# Patient Record
Sex: FEMALE | Race: BLACK OR AFRICAN AMERICAN | NOT HISPANIC OR LATINO | URBAN - METROPOLITAN AREA
[De-identification: names, ages, dates, MRNs, and addresses within clinical notes are randomized per-mention and may not be internally consistent; named-entity substitution may affect disease eponyms.]

---

## 2018-07-31 ENCOUNTER — INPATIENT (INPATIENT)
Facility: HOSPITAL | Age: 83
LOS: 0 days | Discharge: HOME | DRG: 287 | End: 2018-08-01
Attending: INTERNAL MEDICINE | Admitting: SPECIALIST
Payer: MEDICARE

## 2018-07-31 ENCOUNTER — TRANSCRIPTION ENCOUNTER (OUTPATIENT)
Age: 83
End: 2018-07-31

## 2018-07-31 VITALS
DIASTOLIC BLOOD PRESSURE: 65 MMHG | OXYGEN SATURATION: 100 % | RESPIRATION RATE: 16 BRPM | HEIGHT: 66 IN | HEART RATE: 66 BPM | SYSTOLIC BLOOD PRESSURE: 147 MMHG | WEIGHT: 203.05 LBS | TEMPERATURE: 98 F

## 2018-07-31 DIAGNOSIS — R94.31 ABNORMAL ELECTROCARDIOGRAM [ECG] [EKG]: ICD-10-CM

## 2018-07-31 DIAGNOSIS — Z98.891 HISTORY OF UTERINE SCAR FROM PREVIOUS SURGERY: Chronic | ICD-10-CM

## 2018-07-31 DIAGNOSIS — H25.9 UNSPECIFIED AGE-RELATED CATARACT: Chronic | ICD-10-CM

## 2018-07-31 DIAGNOSIS — Z98.890 OTHER SPECIFIED POSTPROCEDURAL STATES: Chronic | ICD-10-CM

## 2018-07-31 DIAGNOSIS — Z90.710 ACQUIRED ABSENCE OF BOTH CERVIX AND UTERUS: Chronic | ICD-10-CM

## 2018-07-31 LAB
ALBUMIN SERPL ELPH-MCNC: 4.3 G/DL — SIGNIFICANT CHANGE UP (ref 3.3–5)
ALP SERPL-CCNC: 56 U/L — SIGNIFICANT CHANGE UP (ref 40–120)
ALT FLD-CCNC: 18 U/L — SIGNIFICANT CHANGE UP (ref 10–45)
ANION GAP SERPL CALC-SCNC: 12 MMOL/L — SIGNIFICANT CHANGE UP (ref 5–17)
AST SERPL-CCNC: 21 U/L — SIGNIFICANT CHANGE UP (ref 10–40)
BILIRUB SERPL-MCNC: 0.5 MG/DL — SIGNIFICANT CHANGE UP (ref 0.2–1.2)
BUN SERPL-MCNC: 18 MG/DL — SIGNIFICANT CHANGE UP (ref 7–23)
CALCIUM SERPL-MCNC: 10.1 MG/DL — SIGNIFICANT CHANGE UP (ref 8.4–10.5)
CHLORIDE SERPL-SCNC: 102 MMOL/L — SIGNIFICANT CHANGE UP (ref 96–108)
CO2 SERPL-SCNC: 26 MMOL/L — SIGNIFICANT CHANGE UP (ref 22–31)
CREAT SERPL-MCNC: 0.94 MG/DL — SIGNIFICANT CHANGE UP (ref 0.5–1.3)
GLUCOSE BLDC GLUCOMTR-MCNC: 112 MG/DL — HIGH (ref 70–99)
GLUCOSE BLDC GLUCOMTR-MCNC: 269 MG/DL — HIGH (ref 70–99)
GLUCOSE SERPL-MCNC: 133 MG/DL — HIGH (ref 70–99)
HCT VFR BLD CALC: 39.7 % — SIGNIFICANT CHANGE UP (ref 34.5–45)
HGB BLD-MCNC: 13 G/DL — SIGNIFICANT CHANGE UP (ref 11.5–15.5)
MCHC RBC-ENTMCNC: 24.6 PG — LOW (ref 27–34)
MCHC RBC-ENTMCNC: 32.8 GM/DL — SIGNIFICANT CHANGE UP (ref 32–36)
MCV RBC AUTO: 74.9 FL — LOW (ref 80–100)
PLATELET # BLD AUTO: 189 K/UL — SIGNIFICANT CHANGE UP (ref 150–400)
POTASSIUM SERPL-MCNC: 3.8 MMOL/L — SIGNIFICANT CHANGE UP (ref 3.5–5.3)
POTASSIUM SERPL-SCNC: 3.8 MMOL/L — SIGNIFICANT CHANGE UP (ref 3.5–5.3)
PROT SERPL-MCNC: 7.5 G/DL — SIGNIFICANT CHANGE UP (ref 6–8.3)
RBC # BLD: 5.31 M/UL — HIGH (ref 3.8–5.2)
RBC # FLD: 14.4 % — SIGNIFICANT CHANGE UP (ref 10.3–14.5)
SODIUM SERPL-SCNC: 140 MMOL/L — SIGNIFICANT CHANGE UP (ref 135–145)
WBC # BLD: 6.8 K/UL — SIGNIFICANT CHANGE UP (ref 3.8–10.5)
WBC # FLD AUTO: 6.8 K/UL — SIGNIFICANT CHANGE UP (ref 3.8–10.5)

## 2018-07-31 PROCEDURE — 93010 ELECTROCARDIOGRAM REPORT: CPT

## 2018-07-31 RX ORDER — DEXTROSE 50 % IN WATER 50 %
12.5 SYRINGE (ML) INTRAVENOUS ONCE
Qty: 0 | Refills: 0 | Status: DISCONTINUED | OUTPATIENT
Start: 2018-07-31 | End: 2018-08-01

## 2018-07-31 RX ORDER — DEXTROSE 50 % IN WATER 50 %
25 SYRINGE (ML) INTRAVENOUS ONCE
Qty: 0 | Refills: 0 | Status: DISCONTINUED | OUTPATIENT
Start: 2018-07-31 | End: 2018-08-01

## 2018-07-31 RX ORDER — PANTOPRAZOLE SODIUM 20 MG/1
40 TABLET, DELAYED RELEASE ORAL
Qty: 0 | Refills: 0 | Status: DISCONTINUED | OUTPATIENT
Start: 2018-07-31 | End: 2018-08-01

## 2018-07-31 RX ORDER — TRIAMTERENE/HYDROCHLOROTHIAZID 75 MG-50MG
1 TABLET ORAL
Qty: 0 | Refills: 0 | COMMUNITY

## 2018-07-31 RX ORDER — SODIUM CHLORIDE 9 MG/ML
1000 INJECTION, SOLUTION INTRAVENOUS
Qty: 0 | Refills: 0 | Status: DISCONTINUED | OUTPATIENT
Start: 2018-07-31 | End: 2018-08-01

## 2018-07-31 RX ORDER — DOCUSATE SODIUM 100 MG
100 CAPSULE ORAL
Qty: 0 | Refills: 0 | Status: DISCONTINUED | OUTPATIENT
Start: 2018-07-31 | End: 2018-08-01

## 2018-07-31 RX ORDER — DEXTROSE 50 % IN WATER 50 %
15 SYRINGE (ML) INTRAVENOUS ONCE
Qty: 0 | Refills: 0 | Status: DISCONTINUED | OUTPATIENT
Start: 2018-07-31 | End: 2018-08-01

## 2018-07-31 RX ORDER — GLUCAGON INJECTION, SOLUTION 0.5 MG/.1ML
1 INJECTION, SOLUTION SUBCUTANEOUS ONCE
Qty: 0 | Refills: 0 | Status: DISCONTINUED | OUTPATIENT
Start: 2018-07-31 | End: 2018-08-01

## 2018-07-31 RX ORDER — TRIAMTERENE/HYDROCHLOROTHIAZID 75 MG-50MG
1 TABLET ORAL EVERY OTHER DAY
Qty: 0 | Refills: 0 | Status: DISCONTINUED | OUTPATIENT
Start: 2018-07-31 | End: 2018-08-01

## 2018-07-31 RX ORDER — ASPIRIN/CALCIUM CARB/MAGNESIUM 324 MG
81 TABLET ORAL DAILY
Qty: 0 | Refills: 0 | Status: DISCONTINUED | OUTPATIENT
Start: 2018-07-31 | End: 2018-08-01

## 2018-07-31 RX ORDER — MULTIVIT-MIN/FERROUS GLUCONATE 9 MG/15 ML
1 LIQUID (ML) ORAL DAILY
Qty: 0 | Refills: 0 | Status: DISCONTINUED | OUTPATIENT
Start: 2018-07-31 | End: 2018-08-01

## 2018-07-31 RX ORDER — INSULIN LISPRO 100/ML
VIAL (ML) SUBCUTANEOUS AT BEDTIME
Qty: 0 | Refills: 0 | Status: DISCONTINUED | OUTPATIENT
Start: 2018-07-31 | End: 2018-08-01

## 2018-07-31 RX ORDER — METOPROLOL TARTRATE 50 MG
25 TABLET ORAL
Qty: 0 | Refills: 0 | Status: DISCONTINUED | OUTPATIENT
Start: 2018-07-31 | End: 2018-08-01

## 2018-07-31 RX ORDER — INSULIN LISPRO 100/ML
VIAL (ML) SUBCUTANEOUS
Qty: 0 | Refills: 0 | Status: DISCONTINUED | OUTPATIENT
Start: 2018-07-31 | End: 2018-08-01

## 2018-07-31 RX ORDER — NIFEDIPINE 30 MG
60 TABLET, EXTENDED RELEASE 24 HR ORAL DAILY
Qty: 0 | Refills: 0 | Status: DISCONTINUED | OUTPATIENT
Start: 2018-07-31 | End: 2018-08-01

## 2018-07-31 RX ORDER — METOPROLOL TARTRATE 50 MG
1 TABLET ORAL
Qty: 0 | Refills: 0 | COMMUNITY

## 2018-07-31 RX ORDER — ESOMEPRAZOLE MAGNESIUM 40 MG/1
1 CAPSULE, DELAYED RELEASE ORAL
Qty: 0 | Refills: 0 | COMMUNITY

## 2018-07-31 RX ADMIN — Medication 1: at 22:07

## 2018-07-31 RX ADMIN — Medication 100 MILLIGRAM(S): at 20:55

## 2018-07-31 RX ADMIN — Medication 25 MILLIGRAM(S): at 20:55

## 2018-07-31 NOTE — H&P CARDIOLOGY - PMH
Arthritis    Cardiomyopathy    Carpal tunnel syndrome    Diverticulitis    DM (diabetes mellitus) type II, controlled, with peripheral vascular disorder    Glaucoma    Gout    Hernia, inguinal, right    HTN (hypertension)    Hyperlipemia    Umbilical hernia

## 2018-07-31 NOTE — H&P CARDIOLOGY - PSH
H/O lumpectomy  both breasts cysts  H/O:     Senile cataracts of both eyes H/O hysterectomy for benign disease    H/O lumpectomy  both breasts cysts  H/O parathyroidectomy    H/O:     Senile cataracts of both eyes

## 2018-07-31 NOTE — H&P CARDIOLOGY - HISTORY OF PRESENT ILLNESS
This is an 85y/o [facility] female with PMHx CM, DM Type 2 non-insulin on Prandin HGb AIC 2 weeks ago 5.5  compliant with meds , HLD , Essential HTN, Angina Pectoris. Pt went to Dr Morocho (cardiologist ) due to recent chest pains hx arthritis in her ribs  and had echo done. Pt was told "I have a blockage in left artery of heart " and needs angiogram .  recommended Stress test that was done on 7/28/18 and results noted to be Abnormal. Now referred for Cardiac cath with Dr Alfaro. Currently CP free no sob no palpitations no lightheadedness or dizziness noted.   As per patient +stress test no results on chart at this time .

## 2018-08-01 VITALS
SYSTOLIC BLOOD PRESSURE: 124 MMHG | HEART RATE: 56 BPM | DIASTOLIC BLOOD PRESSURE: 60 MMHG | OXYGEN SATURATION: 98 % | TEMPERATURE: 98 F | RESPIRATION RATE: 18 BRPM

## 2018-08-01 DIAGNOSIS — Z98.890 OTHER SPECIFIED POSTPROCEDURAL STATES: ICD-10-CM

## 2018-08-01 DIAGNOSIS — I10 ESSENTIAL (PRIMARY) HYPERTENSION: ICD-10-CM

## 2018-08-01 DIAGNOSIS — E78.5 HYPERLIPIDEMIA, UNSPECIFIED: ICD-10-CM

## 2018-08-01 LAB
ANION GAP SERPL CALC-SCNC: 13 MMOL/L — SIGNIFICANT CHANGE UP (ref 5–17)
BUN SERPL-MCNC: 18 MG/DL — SIGNIFICANT CHANGE UP (ref 7–23)
CALCIUM SERPL-MCNC: 10.1 MG/DL — SIGNIFICANT CHANGE UP (ref 8.4–10.5)
CHLORIDE SERPL-SCNC: 101 MMOL/L — SIGNIFICANT CHANGE UP (ref 96–108)
CO2 SERPL-SCNC: 25 MMOL/L — SIGNIFICANT CHANGE UP (ref 22–31)
CREAT SERPL-MCNC: 1.02 MG/DL — SIGNIFICANT CHANGE UP (ref 0.5–1.3)
GLUCOSE BLDC GLUCOMTR-MCNC: 180 MG/DL — HIGH (ref 70–99)
GLUCOSE BLDC GLUCOMTR-MCNC: 99 MG/DL — SIGNIFICANT CHANGE UP (ref 70–99)
GLUCOSE SERPL-MCNC: 247 MG/DL — HIGH (ref 70–99)
HCT VFR BLD CALC: 39 % — SIGNIFICANT CHANGE UP (ref 34.5–45)
HGB BLD-MCNC: 12.9 G/DL — SIGNIFICANT CHANGE UP (ref 11.5–15.5)
MCHC RBC-ENTMCNC: 24.6 PG — LOW (ref 27–34)
MCHC RBC-ENTMCNC: 33 GM/DL — SIGNIFICANT CHANGE UP (ref 32–36)
MCV RBC AUTO: 74.5 FL — LOW (ref 80–100)
PLATELET # BLD AUTO: 170 K/UL — SIGNIFICANT CHANGE UP (ref 150–400)
POTASSIUM SERPL-MCNC: 4 MMOL/L — SIGNIFICANT CHANGE UP (ref 3.5–5.3)
POTASSIUM SERPL-SCNC: 4 MMOL/L — SIGNIFICANT CHANGE UP (ref 3.5–5.3)
RBC # BLD: 5.24 M/UL — HIGH (ref 3.8–5.2)
RBC # FLD: 14.1 % — SIGNIFICANT CHANGE UP (ref 10.3–14.5)
SODIUM SERPL-SCNC: 139 MMOL/L — SIGNIFICANT CHANGE UP (ref 135–145)
WBC # BLD: 10.5 K/UL — SIGNIFICANT CHANGE UP (ref 3.8–10.5)
WBC # FLD AUTO: 10.5 K/UL — SIGNIFICANT CHANGE UP (ref 3.8–10.5)

## 2018-08-01 PROCEDURE — 80048 BASIC METABOLIC PNL TOTAL CA: CPT

## 2018-08-01 PROCEDURE — C1887: CPT

## 2018-08-01 PROCEDURE — 82962 GLUCOSE BLOOD TEST: CPT

## 2018-08-01 PROCEDURE — C1894: CPT

## 2018-08-01 PROCEDURE — 85027 COMPLETE CBC AUTOMATED: CPT

## 2018-08-01 PROCEDURE — 93460 R&L HRT ART/VENTRICLE ANGIO: CPT

## 2018-08-01 PROCEDURE — 80053 COMPREHEN METABOLIC PANEL: CPT

## 2018-08-01 PROCEDURE — 93005 ELECTROCARDIOGRAM TRACING: CPT

## 2018-08-01 PROCEDURE — C1769: CPT

## 2018-08-01 RX ORDER — HYDROCORTISONE 20 MG
100 TABLET ORAL ONCE
Qty: 0 | Refills: 0 | Status: COMPLETED | OUTPATIENT
Start: 2018-08-01 | End: 2018-08-01

## 2018-08-01 RX ORDER — ASPIRIN/CALCIUM CARB/MAGNESIUM 324 MG
243 TABLET ORAL ONCE
Qty: 0 | Refills: 0 | Status: DISCONTINUED | OUTPATIENT
Start: 2018-08-01 | End: 2018-08-01

## 2018-08-01 RX ORDER — DIPHENHYDRAMINE HCL 50 MG
50 CAPSULE ORAL ONCE
Qty: 0 | Refills: 0 | Status: DISCONTINUED | OUTPATIENT
Start: 2018-08-01 | End: 2018-08-01

## 2018-08-01 RX ORDER — CLOPIDOGREL BISULFATE 75 MG/1
600 TABLET, FILM COATED ORAL ONCE
Qty: 0 | Refills: 0 | Status: DISCONTINUED | OUTPATIENT
Start: 2018-08-01 | End: 2018-08-01

## 2018-08-01 RX ADMIN — PANTOPRAZOLE SODIUM 40 MILLIGRAM(S): 20 TABLET, DELAYED RELEASE ORAL at 05:37

## 2018-08-01 RX ADMIN — Medication 81 MILLIGRAM(S): at 05:37

## 2018-08-01 RX ADMIN — Medication 100 MILLIGRAM(S): at 05:37

## 2018-08-01 RX ADMIN — Medication 20 MILLIGRAM(S): at 05:37

## 2018-08-01 RX ADMIN — Medication 1 TABLET(S): at 05:37

## 2018-08-01 RX ADMIN — Medication 25 MILLIGRAM(S): at 05:37

## 2018-08-01 RX ADMIN — Medication 1: at 11:27

## 2018-08-01 RX ADMIN — Medication 100 MILLIGRAM(S): at 09:41

## 2018-08-01 NOTE — DISCHARGE NOTE ADULT - ADDITIONAL INSTRUCTIONS
follow up with Dr Alfaro in one week    No heavy lifting, strenuous activity, bending, straining or unneccessary stair climbing  for 2 weeks. No sex for 1 week.  No driving for 2 days. You may shower 24 hours following procedure but avoid baths and swimming for 1 week. Check groin site for bleeding and/or swelling daily following procedure. Call your doctor/cardiologist immediately should it occur or if you have increased/persistent pain at the site. Follow up with your cardiologist in 1- 2 weeks. You may call [facility] at [phone] or [phone] after office hours and weekends  with any questions or concerns following your procedure. Take medications as prescribed.

## 2018-08-01 NOTE — PROGRESS NOTE ADULT - SUBJECTIVE AND OBJECTIVE BOX
Patient is a 84y old  Female who presents with a chief complaint of         Allergies    angiotensin converting enzyme inhibitors (Unknown)  iodine (Hives)  pt allergic to cataloupe (Hives)  shellfish (Hives)  Vasotec (Unknown)    Intolerances        Medications:  aspirin enteric coated 81 milliGRAM(s) Oral daily  dextrose 40% Gel 15 Gram(s) Oral once PRN  dextrose 5%. 1000 milliLiter(s) IV Continuous <Continuous>  dextrose 50% Injectable 12.5 Gram(s) IV Push once  dextrose 50% Injectable 25 Gram(s) IV Push once  dextrose 50% Injectable 25 Gram(s) IV Push once  docusate sodium 100 milliGRAM(s) Oral two times a day  glucagon  Injectable 1 milliGRAM(s) IntraMuscular once PRN  insulin lispro (HumaLOG) corrective regimen sliding scale   SubCutaneous three times a day before meals  insulin lispro (HumaLOG) corrective regimen sliding scale   SubCutaneous at bedtime  metoprolol tartrate 25 milliGRAM(s) Oral two times a day  multivitamin/minerals 1 Tablet(s) Oral daily  NIFEdipine XL 60 milliGRAM(s) Oral daily  pantoprazole    Tablet 40 milliGRAM(s) Oral before breakfast  predniSONE   Tablet 20 milliGRAM(s) Oral daily  triamterene 37.5 mG/hydrochlorothiazide 25 mG Tablet 1 Tablet(s) Oral every other day      Vitals:  T(C): 36.7 (18 @ 22:05), Max: 36.7 (18 @ 22:05)  HR: 55 (18 @ 23:05) (55 - 85)  BP: 109/60 (18 @ 23:05) (109/60 - 147/65)  BP(mean): 92 (18 @ 14:10) (92 - 92)  RR: 15 (18 @ 23:05) (15 - 18)  SpO2: 98% (18 @ 23:05) (97% - 100%)  Wt(kg): --  Daily Height in cm: 167.64 (2018 14:10)    Daily Weight in k.1 (2018 14:10)  I&O's Summary    2018 07:01  -  01 Aug 2018 04:14  --------------------------------------------------------  IN: 0 mL / OUT: 400 mL / NET: -400 mL          Physical Exam:  Appearance: Normal  Eyes: PERRL, EOMI  HENT: Normal oral muscosa, NC/AT  Cardiovascular: S1S2, RRR, No M/R/G, no JVD, No Lower extremity edema  Procedural Access Site: No hematoma, Non-tender to palpation, 2+ pulse, No bruit, No Ecchymosis  Respiratory: Clear to auscultation bilaterally  Gastrointestinal: Soft, Non tender, Normal Bowel Sounds  Musculoskeletal: No clubbing, No joint deformity   Neurologic: Non-focal  Lymphatic: No lymphadenopathy  Psychiatry: AAOx3, Mood & affect appropriate  Skin: No rashes, No ecchymoses, No cyanosis        139  |  101  |  18  ----------------------------<  247<H>  4.0   |  25  |  1.02    Ca    10.1      01 Aug 2018 00:11    TPro  7.5  /  Alb  4.3  /  TBili  0.5  /  DBili  x   /  AST  21  /  ALT  18  /  AlkPhos  56              Lipid panel   Hgb A1c                         12.9   10.5  )-----------( 170      ( 01 Aug 2018 00:11 )             39.0         ECG: SR 69 bpm    Cath: diagnostic on , staged PCI on     Imaging:    Interpretation of Telemetry:

## 2018-08-01 NOTE — DISCHARGE NOTE ADULT - CARE PROVIDER_API CALL
Benjamin Alfaro), Internal Medicine  [address]  [address]  [place] [address]  Phone: [phone]  Fax: [phone]

## 2018-08-01 NOTE — DISCHARGE NOTE ADULT - PLAN OF CARE
You will be free of chest pain or shortness of breath. Continue your medications. Do not stop Aspirin unless instructed by your cardiologist.  No heavy lifting, strenuous activity, bending, straining or unnecessary stair climbing for 2 weeks. No sex for 1 week.  No driving for 2 days. You may shower 24 hours following procedure but avoid baths and swimming for 1 week. Check groin site for bleeding and/or swelling daily following procedure. Call your doctor/cardiologist immediately for bleeding or swelling or if you have increased/persistent pain or drainage at the site. Follow up with your cardiologist in 1- 2 weeks. You may call [facility] at [phone] or [phone] after office hours and weekends with any questions or concerns following your procedure. LDL<70 Goal is to keep LDL<70. Continue with your cholesterol medications as prescribed. Eat a heart healthy diet that is low in saturated fats and salt, and includes whole grains, fruits, vegetables and lean protein; exercise regularly (consult with your physician or cardiologist first); maintain a heart healthy weight; if you smoke - quit (A resource to help you stop smoking is the [facility] – phone number [phone].). Continue to follow with your primary physician or cardiologist. Your blood pressure will be controlled. Continue with your blood pressure medications; eat a heart healthy diet with low salt diet; exercise regularly (consult with your physician or cardiologist first); maintain a heart healthy weight; if you smoke - quit (A resource to help you stop smoking is the [facility] Tobacco [facility] – phone number [phone].); include healthy ways to manage stress. Continue to follow with your primary care physician or cardiologist.

## 2018-08-01 NOTE — DISCHARGE NOTE ADULT - PATIENT PORTAL LINK FT
You can access the [facility] Patient Portal, offered by [facility], by registering with the following website: [email or web address]

## 2018-08-01 NOTE — DISCHARGE NOTE ADULT - MEDICATION SUMMARY - MEDICATIONS TO TAKE
I will START or STAY ON the medications listed below when I get home from the hospital:    Aspir 81 oral delayed release tablet  -- 1 tab(s) by mouth once a day  -- Indication: For Coronary artery disease    Prandin 1 mg oral tablet  -- 1 tab(s) by mouth 2 times a day (before meals)  -- Indication: For Diabetes    triamterene-hydrochlorothiazide 37.5 mg-25 mg oral tablet  -- 1 tab(s) by mouth every other day  -- Indication: For blood pressure    Metoprolol Tartrate 25 mg oral tablet  -- 1 tab(s) by mouth 2 times a day  -- Indication: For blood pressure    NIFEdipine 60 mg oral tablet, extended release  -- 1 tab(s) by mouth once a day  -- Indication: For blood pressure    Colace 100 mg oral capsule  -- 1 cap(s) by mouth 2 times a day  -- Indication: For Stool softner    NexIUM 24HR 20 mg oral delayed release capsule  -- 1 cap(s) by mouth once a day  -- Indication: For Stomach acid supression    Antioxidant Multiple Vitamins and Minerals oral tablet  -- 1 tab(s) by mouth once a day  -- Indication: For Supplement

## 2018-08-01 NOTE — DISCHARGE NOTE ADULT - HOSPITAL COURSE
This is an 85y/o [place] female with PMHx CM, DM Type 2 non-insulin on Prandin HGb AIC 2 weeks ago 5.5  compliant with meds , HLD , Essential HTN, Angina Pectoris. Pt went to Dr Morocho (cardiologist ) due to recent chest pains hx arthritis in her ribs  and had echo done. Pt was told "I have a blockage in left artery of heart " and needs angiogram .  recommended Stress test that was done on 7/28/18 and results noted to be Abnormal. Now referred for Cardiac cath with Dr Alfaro. Currently CP free no sob no palpitations no lightheadedness or dizziness noted.   As per patient +stress test no results on chart at this time . Pt underwent angiogram revealing non obstructive CAD. Films reviewed by Dr Plasencia, no need for intervention at this time. Pt to medically managed and follow up with MARCE Alfaro next week. Right femoral artery access site without bleed or hematoma. Stable for discharge. Pt is chest pain free.

## 2018-08-01 NOTE — PROGRESS NOTE ADULT - ASSESSMENT
HPI:  This is an 83y/o [facility] female with PMHx CM, DM Type 2 non-insulin on Prandin HGb AIC 2 weeks ago 5.5  compliant with meds , HLD , Essential HTN, Angina Pectoris. Pt went to Dr Morocho (cardiologist ) due to recent chest pains hx arthritis in her ribs  and had echo done. Pt was told "I have a blockage in left artery of heart " and needs angiogram .  recommended Stress test that was done on 7/28/18 and results noted to be Abnormal. Now referred for Cardiac cath with Dr Alfaro. Currently CP free no sob no palpitations no lightheadedness or dizziness noted.   As per patient +stress test no results on chart at this time . (31 Jul 2018 14:10)

## 2018-08-01 NOTE — DISCHARGE NOTE ADULT - CARE PLAN
Principal Discharge DX:	CAD (coronary artery disease)  Goal:	You will be free of chest pain or shortness of breath.  Assessment and plan of treatment:	Continue your medications. Do not stop Aspirin unless instructed by your cardiologist.  No heavy lifting, strenuous activity, bending, straining or unnecessary stair climbing for 2 weeks. No sex for 1 week.  No driving for 2 days. You may shower 24 hours following procedure but avoid baths and swimming for 1 week. Check groin site for bleeding and/or swelling daily following procedure. Call your doctor/cardiologist immediately for bleeding or swelling or if you have increased/persistent pain or drainage at the site. Follow up with your cardiologist in 1- 2 weeks. You may call [facility] at [phone] or [phone] after office hours and weekends with any questions or concerns following your procedure.  Secondary Diagnosis:	Hyperlipemia  Goal:	LDL<70  Assessment and plan of treatment:	Goal is to keep LDL<70. Continue with your cholesterol medications as prescribed. Eat a heart healthy diet that is low in saturated fats and salt, and includes whole grains, fruits, vegetables and lean protein; exercise regularly (consult with your physician or cardiologist first); maintain a heart healthy weight; if you smoke - quit (A resource to help you stop smoking is the [facility] – phone number [phone].). Continue to follow with your primary physician or cardiologist.  Secondary Diagnosis:	HTN (hypertension)  Goal:	Your blood pressure will be controlled.  Assessment and plan of treatment:	Continue with your blood pressure medications; eat a heart healthy diet with low salt diet; exercise regularly (consult with your physician or cardiologist first); maintain a heart healthy weight; if you smoke - quit (A resource to help you stop smoking is the [facility] – phone number [phone].); include healthy ways to manage stress. Continue to follow with your primary care physician or cardiologist.

## 2018-08-01 NOTE — PROGRESS NOTE ADULT - PROBLEM SELECTOR PLAN 1
Monitor groin site for swelling, bleeding  Continue ASA, Plavix   staged PCI on 8/1 Monitor groin site for swelling, bleeding  Continue ASA, Plavix

## 2018-09-04 PROBLEM — I10 ESSENTIAL (PRIMARY) HYPERTENSION: Chronic | Status: ACTIVE | Noted: 2018-07-31

## 2018-09-04 PROBLEM — K42.9 UMBILICAL HERNIA WITHOUT OBSTRUCTION OR GANGRENE: Chronic | Status: ACTIVE | Noted: 2018-07-31

## 2018-09-04 PROBLEM — K40.90 UNILATERAL INGUINAL HERNIA, WITHOUT OBSTRUCTION OR GANGRENE, NOT SPECIFIED AS RECURRENT: Chronic | Status: ACTIVE | Noted: 2018-07-31

## 2018-09-04 PROBLEM — E78.5 HYPERLIPIDEMIA, UNSPECIFIED: Chronic | Status: ACTIVE | Noted: 2018-07-31

## 2018-09-04 PROBLEM — H40.9 UNSPECIFIED GLAUCOMA: Chronic | Status: ACTIVE | Noted: 2018-07-31

## 2018-09-04 PROBLEM — M10.9 GOUT, UNSPECIFIED: Chronic | Status: ACTIVE | Noted: 2018-07-31

## 2018-09-04 PROBLEM — G56.00 CARPAL TUNNEL SYNDROME, UNSPECIFIED UPPER LIMB: Chronic | Status: ACTIVE | Noted: 2018-07-31

## 2018-09-04 PROBLEM — K57.92 DIVERTICULITIS OF INTESTINE, PART UNSPECIFIED, WITHOUT PERFORATION OR ABSCESS WITHOUT BLEEDING: Chronic | Status: ACTIVE | Noted: 2018-07-31

## 2018-09-04 PROBLEM — I42.9 CARDIOMYOPATHY, UNSPECIFIED: Chronic | Status: ACTIVE | Noted: 2018-07-31

## 2018-09-04 PROBLEM — E11.51 TYPE 2 DIABETES MELLITUS WITH DIABETIC PERIPHERAL ANGIOPATHY WITHOUT GANGRENE: Chronic | Status: ACTIVE | Noted: 2018-07-31

## 2018-09-04 PROBLEM — M19.90 UNSPECIFIED OSTEOARTHRITIS, UNSPECIFIED SITE: Chronic | Status: ACTIVE | Noted: 2018-07-31

## 2018-09-18 ENCOUNTER — APPOINTMENT (OUTPATIENT)
Dept: NEUROLOGY | Facility: CLINIC | Age: 83
End: 2018-09-18
Payer: MEDICARE

## 2018-09-18 VITALS — DIASTOLIC BLOOD PRESSURE: 71 MMHG | HEART RATE: 66 BPM | SYSTOLIC BLOOD PRESSURE: 125 MMHG

## 2018-09-18 DIAGNOSIS — I10 ESSENTIAL (PRIMARY) HYPERTENSION: ICD-10-CM

## 2018-09-18 DIAGNOSIS — R41.3 OTHER AMNESIA: ICD-10-CM

## 2018-09-18 DIAGNOSIS — I25.10 ATHEROSCLEROTIC HEART DISEASE OF NATIVE CORONARY ARTERY W/OUT ANGINA PECTORIS: ICD-10-CM

## 2018-09-18 DIAGNOSIS — F01.50 VASCULAR DEMENTIA W/OUT BEHAVIORAL DISTURBANCE: ICD-10-CM

## 2018-09-18 DIAGNOSIS — E11.9 TYPE 2 DIABETES MELLITUS W/OUT COMPLICATIONS: ICD-10-CM

## 2018-09-18 DIAGNOSIS — G31.84 MILD COGNITIVE IMPAIRMENT, SO STATED: ICD-10-CM

## 2018-09-18 DIAGNOSIS — K59.00 CONSTIPATION, UNSPECIFIED: ICD-10-CM

## 2018-09-18 PROCEDURE — 99205 OFFICE O/P NEW HI 60 MIN: CPT

## 2018-09-18 RX ORDER — GARLIC 1000 MG
1000 CAPSULE ORAL
Refills: 0 | Status: ACTIVE | COMMUNITY
Start: 2018-09-18

## 2018-09-18 RX ORDER — GALANTAMINE 8 MG/1
8 CAPSULE, EXTENDED RELEASE ORAL
Qty: 30 | Refills: 1 | Status: ACTIVE | COMMUNITY
Start: 2018-09-18 | End: 1900-01-01

## 2018-09-18 RX ORDER — REPAGLINIDE 1 MG/1
1 TABLET ORAL
Refills: 0 | Status: ACTIVE | COMMUNITY
Start: 2018-09-18

## 2018-09-18 RX ORDER — NIFEDIPINE 60 MG/1
60 TABLET, EXTENDED RELEASE ORAL DAILY
Refills: 0 | Status: ACTIVE | COMMUNITY
Start: 2018-09-18

## 2018-09-18 RX ORDER — VITAMIN B COMPLEX
TABLET ORAL DAILY
Refills: 0 | Status: ACTIVE | COMMUNITY
Start: 2018-09-18

## 2018-09-18 RX ORDER — TRIAMTERENE AND HYDROCHLOROTHIAZIDE 37.5; 25 MG/1; MG/1
37.5-25 CAPSULE ORAL DAILY
Refills: 0 | Status: ACTIVE | COMMUNITY
Start: 2018-09-18

## 2018-09-18 RX ORDER — KRILL/OM-3/DHA/EPA/PHOSPHO/AST 1000-230MG
81 CAPSULE ORAL
Refills: 0 | Status: ACTIVE | COMMUNITY
Start: 2018-09-18

## 2018-09-18 RX ORDER — CYANOCOBALAMIN (VITAMIN B-12) 1000 MCG
100 TABLET, EXTENDED RELEASE ORAL DAILY
Qty: 30 | Refills: 2 | Status: ACTIVE | COMMUNITY
Start: 2018-09-18

## 2018-09-18 RX ORDER — ISOSORBIDE MONONITRATE 30 MG/1
30 TABLET, EXTENDED RELEASE ORAL DAILY
Refills: 0 | Status: ACTIVE | COMMUNITY
Start: 2018-09-18

## 2018-09-18 RX ORDER — DOCUSATE SODIUM 100 MG/1
100 CAPSULE ORAL 3 TIMES DAILY
Refills: 0 | Status: ACTIVE | COMMUNITY
Start: 2018-09-18

## 2018-10-01 DIAGNOSIS — E85.4 ORGAN-LIMITED AMYLOIDOSIS: ICD-10-CM

## 2018-10-01 DIAGNOSIS — I68.0 ORGAN-LIMITED AMYLOIDOSIS: ICD-10-CM

## 2018-10-01 DIAGNOSIS — F01.50 VASCULAR DEMENTIA W/OUT BEHAVIORAL DISTURBANCE: ICD-10-CM

## 2018-10-10 ENCOUNTER — APPOINTMENT (OUTPATIENT)
Dept: NEUROLOGY | Facility: CLINIC | Age: 83
End: 2018-10-10

## 2018-12-17 ENCOUNTER — APPOINTMENT (OUTPATIENT)
Dept: NEUROLOGY | Facility: CLINIC | Age: 83
End: 2018-12-17

## 2018-12-31 ENCOUNTER — APPOINTMENT (OUTPATIENT)
Dept: NEUROLOGY | Facility: CLINIC | Age: 83
End: 2018-12-31

## 2021-09-22 ENCOUNTER — TRANSCRIPTION ENCOUNTER (OUTPATIENT)
Age: 86
End: 2021-09-22

## 2021-09-22 ENCOUNTER — EMERGENCY (EMERGENCY)
Facility: HOSPITAL | Age: 86
LOS: 1 days | Discharge: HOME | End: 2021-09-22
Attending: EMERGENCY MEDICINE
Payer: MEDICARE

## 2021-09-22 VITALS
TEMPERATURE: 99 F | HEART RATE: 70 BPM | DIASTOLIC BLOOD PRESSURE: 65 MMHG | RESPIRATION RATE: 22 BRPM | SYSTOLIC BLOOD PRESSURE: 138 MMHG | OXYGEN SATURATION: 97 %

## 2021-09-22 VITALS
SYSTOLIC BLOOD PRESSURE: 161 MMHG | OXYGEN SATURATION: 97 % | HEART RATE: 64 BPM | DIASTOLIC BLOOD PRESSURE: 73 MMHG | RESPIRATION RATE: 21 BRPM | TEMPERATURE: 99 F

## 2021-09-22 DIAGNOSIS — H25.9 UNSPECIFIED AGE-RELATED CATARACT: Chronic | ICD-10-CM

## 2021-09-22 DIAGNOSIS — Z98.890 OTHER SPECIFIED POSTPROCEDURAL STATES: Chronic | ICD-10-CM

## 2021-09-22 DIAGNOSIS — Z90.710 ACQUIRED ABSENCE OF BOTH CERVIX AND UTERUS: Chronic | ICD-10-CM

## 2021-09-22 DIAGNOSIS — Z98.891 HISTORY OF UTERINE SCAR FROM PREVIOUS SURGERY: Chronic | ICD-10-CM

## 2021-09-22 LAB
ALBUMIN SERPL ELPH-MCNC: 3.4 G/DL — SIGNIFICANT CHANGE UP (ref 3.3–5)
ALP SERPL-CCNC: 37 U/L — LOW (ref 40–120)
ALT FLD-CCNC: 16 U/L — SIGNIFICANT CHANGE UP (ref 10–45)
ANION GAP SERPL CALC-SCNC: 14 MMOL/L — SIGNIFICANT CHANGE UP (ref 5–17)
ANISOCYTOSIS BLD QL: SLIGHT — SIGNIFICANT CHANGE UP
AST SERPL-CCNC: 26 U/L — SIGNIFICANT CHANGE UP (ref 10–40)
BASOPHILS # BLD AUTO: 0 K/UL — SIGNIFICANT CHANGE UP (ref 0–0.2)
BASOPHILS NFR BLD AUTO: 0 % — SIGNIFICANT CHANGE UP (ref 0–2)
BILIRUB SERPL-MCNC: 0.7 MG/DL — SIGNIFICANT CHANGE UP (ref 0.2–1.2)
BUN SERPL-MCNC: 10 MG/DL — SIGNIFICANT CHANGE UP (ref 7–23)
CALCIUM SERPL-MCNC: 9.2 MG/DL — SIGNIFICANT CHANGE UP (ref 8.4–10.5)
CHLORIDE SERPL-SCNC: 101 MMOL/L — SIGNIFICANT CHANGE UP (ref 96–108)
CO2 SERPL-SCNC: 25 MMOL/L — SIGNIFICANT CHANGE UP (ref 22–31)
CREAT SERPL-MCNC: 0.76 MG/DL — SIGNIFICANT CHANGE UP (ref 0.5–1.3)
EOSINOPHIL # BLD AUTO: 0 K/UL — SIGNIFICANT CHANGE UP (ref 0–0.5)
EOSINOPHIL NFR BLD AUTO: 0 % — SIGNIFICANT CHANGE UP (ref 0–6)
GLUCOSE SERPL-MCNC: 125 MG/DL — HIGH (ref 70–99)
HCT VFR BLD CALC: 33 % — LOW (ref 34.5–45)
HGB BLD-MCNC: 10.5 G/DL — LOW (ref 11.5–15.5)
LYMPHOCYTES # BLD AUTO: 0.49 K/UL — LOW (ref 1–3.3)
LYMPHOCYTES # BLD AUTO: 6.1 % — LOW (ref 13–44)
MAGNESIUM SERPL-MCNC: 1.8 MG/DL — SIGNIFICANT CHANGE UP (ref 1.6–2.6)
MANUAL SMEAR VERIFICATION: SIGNIFICANT CHANGE UP
MCHC RBC-ENTMCNC: 23.7 PG — LOW (ref 27–34)
MCHC RBC-ENTMCNC: 31.8 GM/DL — LOW (ref 32–36)
MCV RBC AUTO: 74.5 FL — LOW (ref 80–100)
MONOCYTES # BLD AUTO: 0.21 K/UL — SIGNIFICANT CHANGE UP (ref 0–0.9)
MONOCYTES NFR BLD AUTO: 2.6 % — SIGNIFICANT CHANGE UP (ref 2–14)
NEUTROPHILS # BLD AUTO: 7.36 K/UL — SIGNIFICANT CHANGE UP (ref 1.8–7.4)
NEUTROPHILS NFR BLD AUTO: 88.7 % — HIGH (ref 43–77)
NEUTS BAND # BLD: 2.6 % — SIGNIFICANT CHANGE UP (ref 0–8)
PHOSPHATE SERPL-MCNC: 2 MG/DL — LOW (ref 2.5–4.5)
PLAT MORPH BLD: NORMAL — SIGNIFICANT CHANGE UP
PLATELET # BLD AUTO: 198 K/UL — SIGNIFICANT CHANGE UP (ref 150–400)
POIKILOCYTOSIS BLD QL AUTO: SLIGHT — SIGNIFICANT CHANGE UP
POLYCHROMASIA BLD QL SMEAR: SLIGHT — SIGNIFICANT CHANGE UP
POTASSIUM SERPL-MCNC: 3.3 MMOL/L — LOW (ref 3.5–5.3)
POTASSIUM SERPL-SCNC: 3.3 MMOL/L — LOW (ref 3.5–5.3)
PROT SERPL-MCNC: 6 G/DL — SIGNIFICANT CHANGE UP (ref 6–8.3)
RAPID RVP RESULT: DETECTED
RBC # BLD: 4.43 M/UL — SIGNIFICANT CHANGE UP (ref 3.8–5.2)
RBC # FLD: 14.7 % — HIGH (ref 10.3–14.5)
RBC BLD AUTO: SIGNIFICANT CHANGE UP
SARS-COV-2 RNA SPEC QL NAA+PROBE: DETECTED
SODIUM SERPL-SCNC: 140 MMOL/L — SIGNIFICANT CHANGE UP (ref 135–145)
WBC # BLD: 8.06 K/UL — SIGNIFICANT CHANGE UP (ref 3.8–10.5)
WBC # FLD AUTO: 8.06 K/UL — SIGNIFICANT CHANGE UP (ref 3.8–10.5)

## 2021-09-22 PROCEDURE — 99284 EMERGENCY DEPT VISIT MOD MDM: CPT | Mod: 25

## 2021-09-22 PROCEDURE — 0225U NFCT DS DNA&RNA 21 SARSCOV2: CPT

## 2021-09-22 PROCEDURE — 83880 ASSAY OF NATRIURETIC PEPTIDE: CPT

## 2021-09-22 PROCEDURE — 93970 EXTREMITY STUDY: CPT | Mod: 26

## 2021-09-22 PROCEDURE — 93970 EXTREMITY STUDY: CPT

## 2021-09-22 PROCEDURE — 80053 COMPREHEN METABOLIC PANEL: CPT

## 2021-09-22 PROCEDURE — 71045 X-RAY EXAM CHEST 1 VIEW: CPT | Mod: 26

## 2021-09-22 PROCEDURE — 99285 EMERGENCY DEPT VISIT HI MDM: CPT

## 2021-09-22 PROCEDURE — 71045 X-RAY EXAM CHEST 1 VIEW: CPT

## 2021-09-22 PROCEDURE — 83735 ASSAY OF MAGNESIUM: CPT

## 2021-09-22 PROCEDURE — 84100 ASSAY OF PHOSPHORUS: CPT

## 2021-09-22 PROCEDURE — 85025 COMPLETE CBC W/AUTO DIFF WBC: CPT

## 2021-09-22 RX ORDER — SODIUM CHLORIDE 9 MG/ML
1000 INJECTION INTRAMUSCULAR; INTRAVENOUS; SUBCUTANEOUS ONCE
Refills: 0 | Status: DISCONTINUED | OUTPATIENT
Start: 2021-09-22 | End: 2021-09-22

## 2021-09-22 RX ORDER — POTASSIUM CHLORIDE 20 MEQ
20 PACKET (EA) ORAL ONCE
Refills: 0 | Status: COMPLETED | OUTPATIENT
Start: 2021-09-22 | End: 2021-09-22

## 2021-09-22 RX ORDER — CEFTRIAXONE 500 MG/1
1000 INJECTION, POWDER, FOR SOLUTION INTRAMUSCULAR; INTRAVENOUS ONCE
Refills: 0 | Status: DISCONTINUED | OUTPATIENT
Start: 2021-09-22 | End: 2021-09-22

## 2021-09-22 RX ORDER — AZITHROMYCIN 500 MG/1
500 TABLET, FILM COATED ORAL ONCE
Refills: 0 | Status: DISCONTINUED | OUTPATIENT
Start: 2021-09-22 | End: 2021-09-22

## 2021-09-22 RX ADMIN — Medication 20 MILLIEQUIVALENT(S): at 06:30

## 2021-09-22 NOTE — ED PROVIDER NOTE - CLINICAL SUMMARY MEDICAL DECISION MAKING FREE TEXT BOX
86 yo F presenting with cough, diagnosed with COVID on 9/20. This is most concerning for viral pneumonia, URI, bacterial pneumonia or CHF. will order labs, 88 yo F presenting with cough, diagnosed with COVID on 9/20. This is most concerning for viral pneumonia, URI, bacterial pneumonia or CHF. will order labs, BNP, CXR, LE ultrasound.

## 2021-09-22 NOTE — ED ADULT NURSE NOTE - CAS ELECT INFOMATION PROVIDED
MD Morales called pt family to update on plan of care. Pt wheeled to triage at time of family arrival.

## 2021-09-22 NOTE — ED PROVIDER NOTE - NSICDXFAMILYHX_GEN_ALL_CORE_FT
FAMILY HISTORY:  Family history of diabetes mellitus  Family history of stroke or transient ischemic attack in father

## 2021-09-22 NOTE — ED PROVIDER NOTE - CHILD ABUSE FACILITY
Chief Complaint   Patient presents with   • Sleep Problem     MARIA C, follow up     Visit Vitals  /62 (BP Location: LUE - Left upper extremity, Patient Position: Sitting, Cuff Size: Large Adult)   Pulse 57   Ht 5' 4\" (1.626 m)   Wt 90.3 kg   SpO2 97%   BMI 34.16 kg/m²       HISTORY OF PRESENT ILLNESS     HPI  Here for MARIA C follow up and CPAP management. Patient was prescribed CPAP in 2005 and wears CPAP 10 cmH20 with medium nasal pillows every night, denies a large amount of leak, gets supplies without problems. Currently using a Resmed S9. She thinks the display has been wrong, only showing a couple of hours of use. PSG done on 10/27/2003 showed an AHI of 26 associated with snoring, apneas, desaturations. She underwent a titration trial on 12/2/03 that showed cpap of 10 resolved symptoms. Patient has improved fatigue, daytime somnolence, and improved sleep since using CPAP. She is need of supplies. [place]: 1/24.    PAST MEDICAL, FAMILY AND SOCIAL HISTORY     Patient Active Problem List   Diagnosis   • Hepatitis A virus infection   • Monoclonal gammopathy   • Sleep apnea   • Hypertension   • Hypercholesteremia   • GERD (gastroesophageal reflux disease)   • Graves disease       I have personally reviewed and updated the following EPIC sections: Current medications, Allergies, Problem list, Past Medical History, Past Surgical History, Social History and Family History      REVIEW OF SYSTEMS     Review of Systems   Constitutional: Negative for fatigue, fever and chills.   HENT: Negative for ear pain, nosebleeds, congestion, sore throat, postnasal drip and sinus pressure.    Respiratory: History of apnea. Negative for cough, chest tightness and wheezing.    Cardiovascular: Negative for chest pain and leg swelling.   Gastrointestinal: Negative for bloating, nausea, vomiting  Genitourinary: Negative for dysuria and frequency.   Musculoskeletal: Negative for myalgias and back pain.   Skin: Negative for rash and wound.    Psychiatric/Behavioral: Negative for sleep disturbance, depression, anxiety.    PHYSICAL EXAM     Physical Exam   Constitutional: Oriented to person, place, and time. Well-developed and well-nourished.   Skin: Skin is warm and dry. No rash noted.   Psychiatric: Normal mood and affect. Behavior is normal.   Auto/CPAP download since 2/4/19 reviewed:    Pressure:10 cmH20   % days used greater than 4 hours: 99%   Residual AHI:5.2   Leak:minimal     ASSESSMENT/PLAN     ASSESSMENT:  1. Obstructive sleep apnea syndrome        PLAN:  Continue with current pressure of 10, still working well for her.  Checked out machine is showing one night usage data currently and shows 5.9 from last night. If it starts doing any more weird things than we should order replacement cpap.  Reviewed download.  Patient is compliant with CPAP usage and is reporting benefits of improved sleep, decreased fatigue.  Return if symptoms worsen or fail to improve.     [facility]

## 2021-09-22 NOTE — ED PROVIDER NOTE - NSFOLLOWUPINSTRUCTIONS_ED_ALL_ED_FT
Activities as tolerated. Please encourage good oral and fluid intake. For pain, please take Tylenol 650mg every 6 hours as needed.    Please see your primary care doctor within 24-48 hours for further management of your symptoms, as well as follow up for upper lung opacity on chest xray (recommend outpatient CT scan).    Please receive monoclonal antibody infusion on Friday.    Please seek emergent medical management if you have any worsening signs or symptoms, such as chest pain, difficulty breathing, loss of consciousness, or persistent vomiting.

## 2021-09-22 NOTE — ED PROVIDER NOTE - NSICDXPASTSURGICALHX_GEN_ALL_CORE_FT
PAST SURGICAL HISTORY:  H/O hysterectomy for benign disease     H/O lumpectomy both breasts cysts    H/O parathyroidectomy     H/O:      Senile cataracts of both eyes

## 2021-09-22 NOTE — ED PROVIDER NOTE - OBJECTIVE STATEMENT
86 yo F with PMH of dementia, HLD presenting with cough that started 1 week ago. Pt was seen by PMD who thought it was allergies. PT's niece was then diagnosed with COVID, she had recently seen her. On Monday, pt had a positive COVID test. Pt is unvaccinated, and lives at home with her daughter. Per daughter, PMD wanted her to get monoclonal antibodies. Pt has been taking OTC mucinex without much relief. Daughter says she has had low PO intake, and has been unsteady on her feet.

## 2021-09-22 NOTE — ED PROVIDER NOTE - PATIENT PORTAL LINK FT
You can access the FollowMyHealth Patient Portal offered by [facility] by registering at the following website: [email or web address]. By joining [employer]’s FollowMyHealth portal, you will also be able to view your health information using other applications (apps) compatible with our system.

## 2021-09-22 NOTE — ED PROVIDER NOTE - NSICDXPASTMEDICALHX_GEN_ALL_CORE_FT
PAST MEDICAL HISTORY:  Arthritis     Cardiomyopathy     Carpal tunnel syndrome     Diverticulitis     DM (diabetes mellitus) type II, controlled, with peripheral vascular disorder     Glaucoma     Gout     Hernia, inguinal, right     HTN (hypertension)     Hyperlipemia     Umbilical hernia

## 2021-09-22 NOTE — ED PROVIDER NOTE - PHYSICAL EXAMINATION
General: WN/WD NAD  Head: Atraumatic, normocephalic  Eyes: EOM grossly in tact, no scleral icterus  ENT: moist mucous membranes  Neurology: A&Ox3, nonfocal, EUCEDA x 4  Respiratory: CTAB, no wheezing, normal respiratory effort  *difficult to auscultate with disposable stethoscope.  CV: RRR, good s1/s2, no S3, Extremities warm and well perfused  Abdominal: Soft, non-distended, non-tender, no masses  Extremities: No edema, no deformities  Skin: warm and dry. No rashes General: WN/WD NAD  Head: Atraumatic, normocephalic  Eyes: EOM grossly in tact, no scleral icterus  ENT: moist mucous membranes  Neurology: A&Ox3, nonfocal, EUCEDA x 4  Respiratory: CTAB, no wheezing, normal respiratory effort  *difficult to auscultate with disposable stethoscope.  CV: RRR, good s1/s2, no S3, Extremities warm and well perfused  Abdominal: Soft, non-distended, non-tender, no masses  Extremities: bilateral non-pitting edema, no deformities  Skin: warm and dry. No rashes

## 2021-09-22 NOTE — ED ADULT NURSE NOTE - NSIMPLEMENTINTERV_GEN_ALL_ED
Implemented All Fall with Harm Risk Interventions:  [place] to call system. Call bell, personal items and telephone within reach. Instruct patient to call for assistance. Room bathroom lighting operational. Non-slip footwear when patient is off stretcher. Physically safe environment: no spills, clutter or unnecessary equipment. Stretcher in lowest position, wheels locked, appropriate side rails in place. Provide visual cue, wrist band, yellow gown, etc. Monitor gait and stability. Monitor for mental status changes and reorient to person, place, and time. Review medications for side effects contributing to fall risk. Reinforce activity limits and safety measures with patient and family. Provide visual clues: red socks.

## 2021-09-22 NOTE — ED PROVIDER NOTE - PROGRESS NOTE DETAILS
CANDY Morales MD  labs non actionable. cxr shows RUL opacity (possible infiltrate vs mass?). VSS, no signs of sepsis. D/w daughters, requesting mAb. Arranged for outpatient mAb. Patient HD and clinically stable, O2 sat 98%. Will f/u with pmd for RUL opacity for CT, will DC.

## 2021-09-22 NOTE — ED ADULT NURSE NOTE - OBJECTIVE STATEMENT
87y Female AOx3 (confused to situation)  with PMH of DM, dementia presents to the ED c/o SOB . Pt is poor historian, reports she was told to come to the ED. On exam, pt has productive cough. Per daughter (on phone) pt had O2 sat low to 93% with productive cough. Was tested covid + on Monday 9/20. Denies N/V, fever/chills, SOB, chest pain. Spontaneous/unlabored respirations, speaking in full sentences. Placed on cardiac monitor - NSR. Side rails up, bed in lowest position, oriented to call bell, safety maintained.

## 2021-09-22 NOTE — ED PROVIDER NOTE - ATTENDING CONTRIBUTION TO CARE
Pt with cough in setting of known covid. exam is benign with unremarkable vitals, no signs of respiratory compromise, hypoxia, or sepsis. labs non-actionable. cxr shows RUL well demarcated opacity. discussed need for more advanced imaging in the short term. pt does not appear to have clinical pneumonia at this time. daughter aware and will take pt to pcp for f/u and for further eval of mass. No indication for inpatient admission at this point. Pt given supportive care instructions and isolation instructions pt is well appearing and stable for d/c with supportive care as outpt.

## 2021-09-24 ENCOUNTER — EMERGENCY (EMERGENCY)
Facility: HOSPITAL | Age: 86
LOS: 1 days | Discharge: HOME | End: 2021-09-24
Attending: STUDENT IN AN ORGANIZED HEALTH CARE EDUCATION/TRAINING PROGRAM
Payer: MEDICARE

## 2021-09-24 ENCOUNTER — APPOINTMENT (OUTPATIENT)
Dept: DISASTER EMERGENCY | Facility: HOSPITAL | Age: 86
End: 2021-09-24

## 2021-09-24 VITALS
HEART RATE: 58 BPM | SYSTOLIC BLOOD PRESSURE: 171 MMHG | RESPIRATION RATE: 18 BRPM | OXYGEN SATURATION: 97 % | DIASTOLIC BLOOD PRESSURE: 62 MMHG | TEMPERATURE: 98 F

## 2021-09-24 VITALS
OXYGEN SATURATION: 95 % | TEMPERATURE: 98 F | RESPIRATION RATE: 16 BRPM | SYSTOLIC BLOOD PRESSURE: 151 MMHG | DIASTOLIC BLOOD PRESSURE: 67 MMHG | HEART RATE: 62 BPM

## 2021-09-24 DIAGNOSIS — Z98.890 OTHER SPECIFIED POSTPROCEDURAL STATES: Chronic | ICD-10-CM

## 2021-09-24 DIAGNOSIS — H25.9 UNSPECIFIED AGE-RELATED CATARACT: Chronic | ICD-10-CM

## 2021-09-24 DIAGNOSIS — Z90.710 ACQUIRED ABSENCE OF BOTH CERVIX AND UTERUS: Chronic | ICD-10-CM

## 2021-09-24 DIAGNOSIS — Z98.891 HISTORY OF UTERINE SCAR FROM PREVIOUS SURGERY: Chronic | ICD-10-CM

## 2021-09-24 PROCEDURE — M0243: CPT | Mod: 52

## 2021-09-24 PROCEDURE — L9998: CPT

## 2021-09-24 PROCEDURE — 99284 EMERGENCY DEPT VISIT MOD MDM: CPT | Mod: 25

## 2021-09-24 RX ORDER — SODIUM CHLORIDE 9 MG/ML
250 INJECTION INTRAMUSCULAR; INTRAVENOUS; SUBCUTANEOUS
Refills: 0 | Status: DISCONTINUED | OUTPATIENT
Start: 2021-09-24 | End: 2021-09-27

## 2021-09-24 RX ADMIN — SODIUM CHLORIDE 250 MILLILITER(S): 9 INJECTION INTRAMUSCULAR; INTRAVENOUS; SUBCUTANEOUS at 12:03

## 2021-09-24 RX ADMIN — SODIUM CHLORIDE 25 MILLILITER(S): 9 INJECTION INTRAMUSCULAR; INTRAVENOUS; SUBCUTANEOUS at 10:49

## 2021-09-24 NOTE — ED PROVIDER NOTE - NSICDXPASTMEDICALHX_GEN_ALL_CORE_FT
PAST MEDICAL HISTORY:  Arthritis     Cardiomyopathy     Carpal tunnel syndrome     Diverticulitis     DM (diabetes mellitus) type II, controlled, with peripheral vascular disorder     Glaucoma     Gout     Hernia, inguinal, right     HTN (hypertension)     Hyperlipemia     Umbilical hernia       Lewy body dementia

## 2021-09-24 NOTE — ED PROVIDER NOTE - OBJECTIVE STATEMENT
88 y/o female h/o Amrit body dementia, HTN, DM, HLD, presenting for monoclonal antibody infusion. Tested positive for covid on September 22nd. Has a mild cough but no other symptoms. Hx obtained from son.

## 2021-09-24 NOTE — ED PROVIDER NOTE - PATIENT PORTAL LINK FT
You can access the FollowMyHealth Patient Portal offered by [facility] by registering at the following website: [email or web address]. By joining [employer]’s FollowMyHealth portal, you will also be able to view your health information using other applications (apps) compatible with our system.

## 2021-09-24 NOTE — ED PROVIDER NOTE - CLINICAL SUMMARY MEDICAL DECISION MAKING FREE TEXT BOX
88 y/o female h/o Amrit body dementia, HTN, DM, HLD, presenting for monoclonal antibody infusion. Vitals stable. Will discharge after infusion and period of observation. 88 y/o female h/o Lewey body dementia, HTN, DM, HLD, presenting for monoclonal antibody infusion. Vitals stable. Will discharge after infusion and period of observation.

## 2021-09-24 NOTE — ED PROVIDER NOTE - NSFOLLOWUPINSTRUCTIONS_ED_ALL_ED_FT
You were seen in the emergency department for: monoclonal antibody infusion  See provided paperwork for information on the infusion.    You may be contacted by the Emergency Department Referrals Coordinator to set up your follow-up appointment within 24-48 hours of your discharge, Monday to Friday. We recommend you follow up with: your primary care doctor.    Please return to the Emergency Department if you experience any of the following symptoms:   - Shortness of breath or trouble breathing  - Pressure, pain or tightness in the chest  - Face drooping, arm weakness or speech difficulty  - Persistence of severe vomiting  - Head injury or loss of consciousness  - Nonstop bleeding or an open wound    (1) Follow up with your primary care physician within the next 24-48 hours as discussed. In addition, we did not find evidence of a life threatening illness on your testing here today, but listed below are the specialists that will be necessary to see as an outpatient to continue the workup.  Please call the numbers listed below or [phone] to set up the necessary appointments.  (2) Take Tylenol (up to 1000mg or 1 g)  and/or Motrin (up to 600mg) up to every 6 hours as needed for pain.   (3) If you had an IV (intravenous) line placed, it was removed. Sometimes, after IV removal, that area can be tender for a few days; if it develops redness and swelling, those could be signs of infection; in which case, return to the Emergency Department for assessment.  (4) Please continue taking all of your home medications as directed.

## 2021-09-25 ENCOUNTER — TRANSCRIPTION ENCOUNTER (OUTPATIENT)
Age: 86
End: 2021-09-25

## 2022-03-08 ENCOUNTER — EMERGENCY (EMERGENCY)
Facility: HOSPITAL | Age: 87
LOS: 1 days | Discharge: HOME | End: 2022-03-08
Attending: EMERGENCY MEDICINE
Payer: MEDICARE

## 2022-03-08 VITALS
HEIGHT: 65 IN | DIASTOLIC BLOOD PRESSURE: 100 MMHG | TEMPERATURE: 98 F | WEIGHT: 149.91 LBS | SYSTOLIC BLOOD PRESSURE: 173 MMHG | HEART RATE: 36 BPM | OXYGEN SATURATION: 99 % | RESPIRATION RATE: 18 BRPM

## 2022-03-08 DIAGNOSIS — Z98.890 OTHER SPECIFIED POSTPROCEDURAL STATES: Chronic | ICD-10-CM

## 2022-03-08 DIAGNOSIS — H25.9 UNSPECIFIED AGE-RELATED CATARACT: Chronic | ICD-10-CM

## 2022-03-08 DIAGNOSIS — Z98.891 HISTORY OF UTERINE SCAR FROM PREVIOUS SURGERY: Chronic | ICD-10-CM

## 2022-03-08 DIAGNOSIS — Z90.710 ACQUIRED ABSENCE OF BOTH CERVIX AND UTERUS: Chronic | ICD-10-CM

## 2022-03-08 LAB
ALBUMIN SERPL ELPH-MCNC: 4.1 G/DL — SIGNIFICANT CHANGE UP (ref 3.3–5)
ALP SERPL-CCNC: 55 U/L — SIGNIFICANT CHANGE UP (ref 40–120)
ALT FLD-CCNC: 8 U/L — LOW (ref 10–45)
ANION GAP SERPL CALC-SCNC: 11 MMOL/L — SIGNIFICANT CHANGE UP (ref 5–17)
APTT BLD: 26 SEC — LOW (ref 27.5–35.5)
AST SERPL-CCNC: 11 U/L — SIGNIFICANT CHANGE UP (ref 10–40)
BASOPHILS # BLD AUTO: 0.08 K/UL — SIGNIFICANT CHANGE UP (ref 0–0.2)
BASOPHILS NFR BLD AUTO: 1.3 % — SIGNIFICANT CHANGE UP (ref 0–2)
BILIRUB SERPL-MCNC: 0.6 MG/DL — SIGNIFICANT CHANGE UP (ref 0.2–1.2)
BUN SERPL-MCNC: 18 MG/DL — SIGNIFICANT CHANGE UP (ref 7–23)
CALCIUM SERPL-MCNC: 10.1 MG/DL — SIGNIFICANT CHANGE UP (ref 8.4–10.5)
CHLORIDE SERPL-SCNC: 104 MMOL/L — SIGNIFICANT CHANGE UP (ref 96–108)
CO2 SERPL-SCNC: 25 MMOL/L — SIGNIFICANT CHANGE UP (ref 22–31)
CREAT SERPL-MCNC: 1.04 MG/DL — SIGNIFICANT CHANGE UP (ref 0.5–1.3)
EGFR: 52 ML/MIN/1.73M2 — LOW
EOSINOPHIL # BLD AUTO: 0.08 K/UL — SIGNIFICANT CHANGE UP (ref 0–0.5)
EOSINOPHIL NFR BLD AUTO: 1.3 % — SIGNIFICANT CHANGE UP (ref 0–6)
GLUCOSE SERPL-MCNC: 124 MG/DL — HIGH (ref 70–99)
HCT VFR BLD CALC: 34 % — LOW (ref 34.5–45)
HGB BLD-MCNC: 10.8 G/DL — LOW (ref 11.5–15.5)
IMM GRANULOCYTES NFR BLD AUTO: 0.7 % — SIGNIFICANT CHANGE UP (ref 0–1.5)
INR BLD: 1.06 RATIO — SIGNIFICANT CHANGE UP (ref 0.88–1.16)
LYMPHOCYTES # BLD AUTO: 1.87 K/UL — SIGNIFICANT CHANGE UP (ref 1–3.3)
LYMPHOCYTES # BLD AUTO: 30.5 % — SIGNIFICANT CHANGE UP (ref 13–44)
MCHC RBC-ENTMCNC: 24.1 PG — LOW (ref 27–34)
MCHC RBC-ENTMCNC: 31.8 GM/DL — LOW (ref 32–36)
MCV RBC AUTO: 75.7 FL — LOW (ref 80–100)
MONOCYTES # BLD AUTO: 0.67 K/UL — SIGNIFICANT CHANGE UP (ref 0–0.9)
MONOCYTES NFR BLD AUTO: 10.9 % — SIGNIFICANT CHANGE UP (ref 2–14)
NEUTROPHILS # BLD AUTO: 3.39 K/UL — SIGNIFICANT CHANGE UP (ref 1.8–7.4)
NEUTROPHILS NFR BLD AUTO: 55.3 % — SIGNIFICANT CHANGE UP (ref 43–77)
NRBC # BLD: 0 /100 WBCS — SIGNIFICANT CHANGE UP (ref 0–0)
PLATELET # BLD AUTO: 180 K/UL — SIGNIFICANT CHANGE UP (ref 150–400)
POTASSIUM SERPL-MCNC: 3.9 MMOL/L — SIGNIFICANT CHANGE UP (ref 3.5–5.3)
POTASSIUM SERPL-SCNC: 3.9 MMOL/L — SIGNIFICANT CHANGE UP (ref 3.5–5.3)
PROT SERPL-MCNC: 6.5 G/DL — SIGNIFICANT CHANGE UP (ref 6–8.3)
PROTHROM AB SERPL-ACNC: 12.2 SEC — SIGNIFICANT CHANGE UP (ref 10.5–13.4)
RBC # BLD: 4.49 M/UL — SIGNIFICANT CHANGE UP (ref 3.8–5.2)
RBC # FLD: 15.3 % — HIGH (ref 10.3–14.5)
SODIUM SERPL-SCNC: 140 MMOL/L — SIGNIFICANT CHANGE UP (ref 135–145)
TROPONIN T, HIGH SENSITIVITY RESULT: 21 NG/L — SIGNIFICANT CHANGE UP (ref 0–51)
WBC # BLD: 6.13 K/UL — SIGNIFICANT CHANGE UP (ref 3.8–10.5)
WBC # FLD AUTO: 6.13 K/UL — SIGNIFICANT CHANGE UP (ref 3.8–10.5)

## 2022-03-08 PROCEDURE — 70450 CT HEAD/BRAIN W/O DYE: CPT | Mod: 26,MA

## 2022-03-08 PROCEDURE — 99285 EMERGENCY DEPT VISIT HI MDM: CPT

## 2022-03-08 NOTE — ED PROVIDER NOTE - NSFOLLOWUPINSTRUCTIONS_ED_ALL_ED_FT
You were seen for garbled speech.    Your work-up in the Emergency Department did not reveal anything acutely concerning, however, we still have a high suspicion you may have a stroke and recommend an MRI.  You do not want to stay and would rather prefer to obtain the care outpatient, so you agreed to leave against medical advice.    You have received the first dose of all medications in the ED.  We have prescribed the rest to your pharmacy.    Please follow up with Dr. Serafin Cross after discharge. Please call [phone] to schedule an appointment within the next 24 hours. Office is located at [address] [place] [address].    If you have any severe increase in pain, fever, uncontrollable nausea vomiting, inability to tolerate eating and drinking, experience one-sided body weakness, or any other concerning symptoms, return immediately to the Emergency Department.

## 2022-03-08 NOTE — ED PROVIDER NOTE - CLINICAL SUMMARY MEDICAL DECISION MAKING FREE TEXT BOX
89yo female with pmh dementia, HTN, DM, HLD, presenting with garbled speech.  Currently back to baseline.  Suspect possible tia, infectious/metabolic derangement.  Stroke code called on arrival, patient with iv contrast allergy so hold cta, hold perfusion as suspect no large vessel occlusion.  Will likely need MR.  Plan for labs, imaging, neuro recs, reassess.

## 2022-03-08 NOTE — ED PROVIDER NOTE - OBJECTIVE STATEMENT
89yo female with pmh dementia, HTN, DM, HLD, presenting with garbled speech.  Accompanied by daughter.  Around 930pm patient had 15 minute episode of slurred/ garbled speech during which she was incoherent to daughter.  On arrival symptoms have improved and she is back to baseline.  Patient denies complaints, feels well.  No

## 2022-03-08 NOTE — ED PROVIDER NOTE - PROGRESS NOTE DETAILS
Attending Eli:  pt code stroke at s/o, being seen initially by evening team, orders placed by myself, pt has iodine allergy, discussed w/ primary team, held on cta for now. Elvis PGY3 - Neuro recommends MRI.  Pt. does not want to stay and states she will obtain an MRI outpatient.  I expressed concern that given pt. got a suboptimal imaging study 2/2 iodine contrast, it is all the more imperative she stays for an MRI.  The patient however still declines and will follow up with their primary physician.  Patient states she would rather pursue care outside the hospital, especially since she's unvaccinated for covid.  The patient is leaving against medical advice.  The patient understands the risk of leaving without further evaluation and workup.  All risks of leaving before obtaining diagnostic work-up, including but not limited to worsening of quality of life, temporary or permanent disability, infections, and/or death have been explained to and understood by the patient and daughter.  Pt. possesses full medical and decision making capacity, and is of sound mind and cognition.  Pt. would prefer to leave AMA.

## 2022-03-08 NOTE — CONSULT NOTE ADULT - ASSESSMENT
87F Creole speaking h/o dementia (possible Lewy-Body), HTN, DM, HLD presents as a stroke code for transient speech disturbance. LKN: 3/8/22 at 19:30. Per daughter, pt had a 10 min episode of garbled speech. Daughter checked /80s, glu >100 and called pt's PCP who told pt to come into the ED. Pt's speech now back to baseline. Not on any AC/AP. Pt has been refusing to take certain medications, including her home aspirin. NIHSS: 1, preMRS. ABCD2 4. No tpa or MT. CTH neg. CTA not done due to prior contrast reaction.    Impression: Transient episode of aphasia likely 2/2 TIA/minor stroke, less likely seizure.    Recommendations:   87F Creole speaking h/o dementia (possible Lewy-Body), HTN, DM, HLD presents as a stroke code for transient speech disturbance. LKN: 3/8/22 at 19:30. Per daughter, pt had a 10 min episode of garbled speech. Daughter checked /80s, glu >100 and called pt's PCP who told pt to come into the ED. Pt's speech now back to baseline. Not on any AC/AP. Pt has been refusing to take certain medications, including her home aspirin. NIHSS: 1, preMRS. ABCD2 4. No tpa or MT. CTH neg. CTA not done due to prior contrast reaction. Neuro exam w/ some disorientation to time.     Impression: Transient episode of aphasia likely 2/2 TIA/minor stroke, less likely seizure.    Recommendations:  [] Gradual normotension  [] MRI brain without contrast and MRA - pt refusing after extensive discussion of risks and benefits with daughter at bedside  [] TTE, can be done inpatient vs. outpatient  [] rEEG, can be done outpatient  [] monitor on telemetry  [] start ASA 81 mg PO daily   [] start plavix 75mg PO daily for 3 weeks per CHANCE  [] start atorvastatin 40mg PO daily (titrate to LDL < 70)   [] stroke risk factor modification and counseling   [] check HA1c, lipid panel   [] NPO until bedside dysphagia screen  [] Patient can follow up with Dr. Serafin Cross after discharge. Please instruct the patient to call [phone] to schedule an appointment within the next 2-3 days. Office is located at [address] [place] [address], [place] [address].    Case discussed with stroke fellow Dr. Rachel Yeung under supervision of attending Dr. Aguilar Sinclair  Case to be seen and discussed with attending Dr. Serafin Cross       88F Creole speaking h/o dementia (possible Lewy-Body), HTN, DM, HLD presents as a stroke code for transient speech disturbance. LKN: 3/8/22 at 19:30. Per daughter, pt had a 10 min episode of garbled speech. Daughter checked /80s, glu >100 and called pt's PCP who told pt to come into the ED. Pt's speech now back to baseline. Not on any AC/AP. Pt has been refusing to take certain medications, including her home aspirin. NIHSS: 1, preMRS. ABCD2 4. No tpa or MT. CTH neg. CTA not done due to prior contrast reaction. Neuro exam w/ some disorientation to time.     Impression: Transient episode of aphasia likely 2/2 TIA/minor stroke, less likely seizure.    Recommendations:  [] Gradual normotension  [] MRI brain without contrast and MRA - pt refusing after extensive discussion of risks and benefits with daughter at bedside  [] TTE, can be done inpatient vs. outpatient  [] rEEG, can be done outpatient  [] monitor on telemetry  [] start ASA 81 mg PO daily   [] start plavix 75mg PO daily for 3 weeks per CHANCE  [] start atorvastatin 40mg PO daily (titrate to LDL < 70)   [] stroke risk factor modification and counseling   [] check HA1c, lipid panel   [] NPO until bedside dysphagia screen  [] Patient can follow up with Dr. Serafin Cross after discharge. Please instruct the patient to call [phone] to schedule an appointment within the next 2-3 days. Office is located at [address] [place] [address], [place] [address].    Case discussed with stroke fellow Dr. Rachel Yeung under supervision of attending Dr. Aguilar Sinclair  Case to be seen and discussed with attending Dr. Serafin Cross       88F Creole speaking h/o dementia (possible Lewy-Body), HTN, DM, HLD presents as a stroke code for transient speech disturbance. LKN: 3/8/22 at 19:30. Per daughter, pt had a 10 min episode of garbled speech. Daughter checked /80s, glu >100 and called pt's PCP who told pt to come into the ED. Pt's speech now back to baseline. Not on any AC/AP. Pt has been refusing to take certain medications, including her home aspirin. NIHSS: 1, preMRS. ABCD2 4. No tpa or MT. CTH neg. CTA not done due to prior contrast reaction. Neuro exam w/ some disorientation to time.     Impression: Transient episode of aphasia likely 2/2 TIA/minor stroke, less likely seizure.    Recommendations:  [] Gradual normotension  [] MRI brain without contrast and MRA - pt refusing after extensive discussion of risks and benefits with daughter at bedside  [] TTE, can be done inpatient vs. outpatient  [] rEEG, can be done outpatient  [] monitor on telemetry  [] start ASA 81 mg PO daily   [] start plavix 75mg PO daily for 3 weeks per CHANCE  [] start atorvastatin 40mg PO daily (titrate to LDL < 70)   [] stroke risk factor modification and counseling   [] check HA1c, lipid panel   [] NPO until bedside dysphagia screen  [] Patient can follow up with Dr. Serafin Cross after discharge. Please instruct the patient to call [phone] to schedule an appointment within the next 2-3 days. Office is located at [address] [place] [address], [place] [address].    Case discussed with stroke fellow Dr. Rachel Yeung under supervision of attending Dr. Aguilar Sinclair  Case to be seen and discussed with attending Dr. Serafin Cross      patient signed out AMA prior to attending nyla

## 2022-03-08 NOTE — ED PROVIDER NOTE - NSICDXPASTMEDICALHX_GEN_ALL_CORE_FT
PAST MEDICAL HISTORY:  Arthritis     Cardiomyopathy     Carpal tunnel syndrome     Diverticulitis     DM (diabetes mellitus) type II, controlled, with peripheral vascular disorder     Glaucoma     Gout     Hernia, inguinal, right     HTN (hypertension)     Hyperlipemia     Lewy body dementia     Umbilical hernia

## 2022-03-08 NOTE — STROKE CODE NOTE - DISPOSITION
Pt with iodine allergy and possible contrast allergy, given that symptoms have resolved, will premedicate pt for CTA vs. MRA/Other

## 2022-03-08 NOTE — ED ADULT NURSE NOTE - NSIMPLEMENTINTERV_GEN_ALL_ED
Implemented All Fall with Harm Risk Interventions:  [place] to call system. Call bell, personal items and telephone within reach. Instruct patient to call for assistance. Room bathroom lighting operational. Non-slip footwear when patient is off stretcher. Physically safe environment: no spills, clutter or unnecessary equipment. Stretcher in lowest position, wheels locked, appropriate side rails in place. Provide visual cue, wrist band, yellow gown, etc. Monitor gait and stability. Monitor for mental status changes and reorient to person, place, and time. Review medications for side effects contributing to fall risk. Reinforce activity limits and safety measures with patient and family. Provide visual clues: red socks.

## 2022-03-08 NOTE — ED PROVIDER NOTE - PHYSICAL EXAMINATION
General appearance: NAD, conversant, afebrile    Eyes: anicteric sclerae, JAZMIN, EOMI   HENT: Atraumatic; oropharynx clear, MMM and no ulcerations, no pharyngeal erythema or exudate   Neck: Trachea midline; Full range of motion, supple   Pulm: CTA bl, normal respiratory effort and no intercostal retractions, normal work of breathing   CV: RRR, No murmurs, rubs, or gallops   Abdomen: Soft, non-tender, non-distended; no guarding or rebound   Extremities: No peripheral edema    Skin: Dry, normal temperature, turgor and texture; no rash   Neuro: CN2-12 grossly intact, MS +5/5 in UE and LE BL, finger to nose smooth and rapid, gross sensation intact in UE and LE BL

## 2022-03-08 NOTE — ED PROVIDER NOTE - ATTENDING CONTRIBUTION TO CARE
RGUJRAL 87yo female hx listed BIB daughter for garbled speech. Episode lasted about 15 minutes and since has resolved. Pt denies any symptoms of HA, neck/chest/abd pain. No cough, uri, f/c. Feels at baseline at this time. Patient had a fall before but has had no acute complaints from it.   On exam, Patient is awake, alert and oriented x 3.  Patient is well appearing and in no acute distress.  NCAT, PERRL  No posterior midline C/T/L tenderness.  Lungs are CTA B/L,+S1S2 no murmurs,  Abdomen:Soft nd/nt+bs no rebound or guarding.  Extremity no edema or calf tender.  Skin with no rash.  Pelvis stable.   Neuro CN3-12 intact. Strength 5/5 in upper and lower extremities. Nml Sensation.  Code stroke on arrival. Check labs, CT/CTA to eval. RGUJRAL 87yo female hx listed BIB daughter for garbled speech. Episode lasted about 15 minutes and since has resolved. Pt denies any symptoms of HA, neck/chest/abd pain. No cough, uri, f/c. Feels at baseline at this time. Patient had a fall before but has had no acute complaints from it. Unclear mechanism.   On exam, Patient is awake, alert and oriented x 3.  Patient is well appearing and in no acute distress.  NCAT, PERRL  No posterior midline C/T/L tenderness.  Lungs are CTA B/L,+S1S2 no murmurs,  Abdomen:Soft nd/nt+bs no rebound or guarding.  Extremity no edema or calf tender.  Skin with no rash.  Pelvis stable.   Neuro CN3-12 intact. Strength 5/5 in upper and lower extremities. Nml Sensation.  Code stroke on arrival. Check labs, CT/CTA to eval.

## 2022-03-08 NOTE — ED PROVIDER NOTE - PATIENT PORTAL LINK FT
You can access the FollowMyHealth Patient Portal offered by [facility] by registering at the following website: [email or web address]. By joining [employer]’s FollowMyHealth portal, you will also be able to view your health information using other applications (apps) compatible with our system.

## 2022-03-08 NOTE — ED ADULT NURSE NOTE - OBJECTIVE STATEMENT
88yF, A&Ox3, PMH dementia, HTN, DM, HLD, presents to the ED with daughter. Code stroke called in triage. Pt experienced 10 minute episode of transient speech disturbance. LKN: 3/8/22 at 19:30, witnessed by family. Daughter checked /80s and called pt's PCP who told pt to come into the ED. Pt's speech back to baseline on arrival. At baseline, pt has dementia (hallucinates seeing familiar people), uses a cane at home but is getting a walker, Not on any AC. Pt denies fevers/chills, numbness/tingling, weakness, headache, dizziness, vision changes, cp, sob, cough, abd pain, n/v/d, dysuria, hematuria, bloody stools, back pain.

## 2022-03-08 NOTE — ED ADULT NURSE NOTE - CAS TRG GENERAL NORM CIRC DET
Endometrial Biopsy     Pre-Procedure Care:   Consent was obtained. Procedure/risks were explained. Questions were answered. Correct patient was identified. Correct side and site were confirmed.     Pregnancy Results: negative from blood test   Birth con Strong peripheral pulses/Capillary refill less/equal to 2 seconds

## 2022-03-08 NOTE — CONSULT NOTE ADULT - SUBJECTIVE AND OBJECTIVE BOX
HPI:  87F h/o dementia (possible Lewy-Body), HTN, DM, HLD presents as a stroke code for transient speech disturbance. LKN: 3/8/22 at 19:30 when pt was ___. Per daughter, pt had a 10 min episode of garbled speech. Daughter checked /80s, glu __ and called pt's PCP who told pt to come into the ED. Pt's speech now back to baseline. At baseline, pt has dementia (hallucinates seeing familiar people), uses a cane and is getting a walker, needs help with ADLs (showering, but can feed self). Not on any AC/AP. Pt has been refusing to take certain medications, including her home aspirin. Pt with known iodine allergy and possible contrast reaction in the past.     LKN: 3/8/22 at 19:30  NIHSS: 1  preMRS: 3  Pt is not a candidate for tpa due to mild, non-disabling deficit  Pt is not a candidate for mechanical thrombectomy due to no large vessel occlusion on CTA    REVIEW OF SYSTEMS    A 10-system ROS was performed and is negative except for those items noted above and/or in the HPI.    PAST MEDICAL & SURGICAL HISTORY:  HTN (hypertension)    DM (diabetes mellitus) type II, controlled, with peripheral vascular disorder    Hernia, inguinal, right    Umbilical hernia    Diverticulitis    Cardiomyopathy    Hyperlipemia    Glaucoma    Arthritis    Carpal tunnel syndrome    Gout    Lewy body dementia    H/O:     H/O lumpectomy  both breasts cysts    Senile cataracts of both eyes    H/O hysterectomy for benign disease    H/O parathyroidectomy      FAMILY HISTORY:  Family history of stroke or transient ischemic attack in father    Family history of diabetes mellitus      SOCIAL HISTORY:   T/E/D:   Occupation:   Lives with:     MEDICATIONS (HOME):  Home Medications:  Antioxidant Multiple Vitamins and Minerals oral tablet: 1 tab(s) orally once a day (2018 18:50)  Aspir 81 oral delayed release tablet: 1 tab(s) orally once a day (2018 18:50)  Colace 100 mg oral capsule: 1 cap(s) orally 2 times a day (2018 18:50)  Metoprolol Tartrate 25 mg oral tablet: 1 tab(s) orally 2 times a day (2018 18:50)  NexIUM 24HR 20 mg oral delayed release capsule: 1 cap(s) orally once a day (2018 18:50)  NIFEdipine 60 mg oral tablet, extended release: 1 tab(s) orally once a day (2018 18:50)  Prandin 1 mg oral tablet: 1 tab(s) orally 2 times a day (before meals) (2018 18:50)  triamterene-hydrochlorothiazide 37.5 mg-25 mg oral tablet: 1 tab(s) orally every other day (2018 18:50)    MEDICATIONS  (STANDING):    MEDICATIONS  (PRN):    ALLERGIES/INTOLERANCES:  Allergies  angiotensin converting enzyme inhibitors (Unknown)  iodine (Hives)  penicillin (Rash)  pt allergic to cataloupe (Hives)  shellfish (Hives)  Vasotec (Unknown)    Intolerances    VITALS & EXAMINATION:  Vital Signs Last 24 Hrs  T(C): 36.4 (08 Mar 2022 22:46), Max: 36.4 (08 Mar 2022 22:46)  T(F): 97.6 (08 Mar 2022 22:46), Max: 97.6 (08 Mar 2022 22:46)  HR: 36 (08 Mar 2022 22:46) (36 - 36)  BP: 173/100 (08 Mar 2022 22:46) (173/100 - 173/100)  BP(mean): --  RR: 18 (08 Mar 2022 22:46) (18 - 18)  SpO2: 99% (08 Mar 2022 22:46) (99% - 99%)    General:  Constitutional: Obese Female, appears stated age, in no apparent distress including pain  Head: Normocephalic & atraumatic.  ENT: Patent ear canals, intact TM, mucus membranes moist & pink, neck supple, no lymphadenopathy.   Respiratory: Patent airway. All lung fields are clear to auscultation bilaterally.  Extremities: No cyanosis, clubbing, or edema.  Skin: No rashes, bruising, or discoloration.    Neurological (>12):  MS: Awake, alert, oriented to person, place, situation, time. Normal affect. Follows all commands.    Language: Speech is clear, fluent with good repetition & comprehension (able to name objects___)    CNs: PERRLA (R = 3mm, L = 3mm). VFF. EOMI no nystagmus, no diplopia. V1-3 intact to LT/pinprick, well developed masseter muscles b/l. No facial asymmetry b/l, full eye closure strength b/l. Hearing grossly normal (rubbing fingers) b/l. Symmetric palate elevation in midline. Gag reflex deferred. Head turning & shoulder shrug intact b/l. Tongue midline, normal movements, no atrophy.    Motor: Normal muscle bulk & tone. No noticeable tremor or seizure. No pronator drift.              Deltoid	Biceps	Triceps	Wrist	Finger ABd	   R	5	5	5	5	5		5 	  L	5	5	5	5	5		5    	H-Flex	H-Ext	H-ABd	H-ADd	K-Flex	K-Ext	D-Flex	P-Flex  R	5	5	5	5	5	5	5	5 	   L	5	5	5	5	5	5	5	5	     Sensation: Intact to LT/PP/Temp/Vibration/Position b/l throughout.     Cortical: Extinction on DSS (neglect): none    Reflexes:              Biceps(C5)       BR(C6)     Triceps(C7)               Patellar(L4)    Achilles(S1)    Plantar Resp  R	2	          2	             2		        2		    2		Down   L	2	          2	             2		        2		    2		Down     Coordination: intact rapid-alt movements. No dysmetria to FTN/HTS    Gait: Normal Romberg. No postural instability. Normal stance and tandem gait.     LABORATORY:    STUDIES & IMAGING:  Studies (EKG, EEG, EMG, etc):     Radiology (XR, CT, MR, U/S, TTE/SU):     HPI:  87F Creole speaking h/o dementia (possible Lewy-Body), HTN, DM, HLD presents as a stroke code for transient speech disturbance. LKN: 3/8/22 at 19:30. Per daughter, pt had a 10 min episode of garbled speech. Daughter checked /80s, glu >100 and called pt's PCP who told pt to come into the ED. Pt's speech now back to baseline. At baseline, pt has dementia (hallucinates seeing familiar people), uses a cane and is getting a walker, needs help with ADLs (showering, but can feed self). Not on any AC/AP. Pt has been refusing to take certain medications, including her home aspirin. Pt with known iodine allergy and possible contrast reaction in the past.     LKN: 3/8/22 at 19:30  NIHSS: 1  preMRS: 3  Pt is not a candidate for tpa due to mild, non-disabling deficit  Pt is not a candidate for mechanical thrombectomy due to low NIHSS    REVIEW OF SYSTEMS    A 10-system ROS was performed and is negative except for those items noted above and/or in the HPI.    PAST MEDICAL & SURGICAL HISTORY:  HTN (hypertension)    DM (diabetes mellitus) type II, controlled, with peripheral vascular disorder    Hernia, inguinal, right    Umbilical hernia    Diverticulitis    Cardiomyopathy    Hyperlipemia    Glaucoma    Arthritis    Carpal tunnel syndrome    Gout    Lewy body dementia    H/O:     H/O lumpectomy  both breasts cysts    Senile cataracts of both eyes    H/O hysterectomy for benign disease    H/O parathyroidectomy      FAMILY HISTORY:  Family history of stroke or transient ischemic attack in father    Family history of diabetes mellitus      SOCIAL HISTORY:   T/E/D:   Occupation:   Lives with:     MEDICATIONS (HOME):  Home Medications:  Antioxidant Multiple Vitamins and Minerals oral tablet: 1 tab(s) orally once a day (2018 18:50)  Aspir 81 oral delayed release tablet: 1 tab(s) orally once a day (2018 18:50)  Colace 100 mg oral capsule: 1 cap(s) orally 2 times a day (2018 18:50)  Metoprolol Tartrate 25 mg oral tablet: 1 tab(s) orally 2 times a day (2018 18:50)  NexIUM 24HR 20 mg oral delayed release capsule: 1 cap(s) orally once a day (2018 18:50)  NIFEdipine 60 mg oral tablet, extended release: 1 tab(s) orally once a day (2018 18:50)  Prandin 1 mg oral tablet: 1 tab(s) orally 2 times a day (before meals) (2018 18:50)  triamterene-hydrochlorothiazide 37.5 mg-25 mg oral tablet: 1 tab(s) orally every other day (2018 18:50)    MEDICATIONS  (STANDING):    MEDICATIONS  (PRN):    ALLERGIES/INTOLERANCES:  Allergies  angiotensin converting enzyme inhibitors (Unknown)  iodine (Hives)  penicillin (Rash)  pt allergic to cataloupe (Hives)  shellfish (Hives)  Vasotec (Unknown)    Intolerances    VITALS & EXAMINATION:  Vital Signs Last 24 Hrs  T(C): 36.4 (08 Mar 2022 22:46), Max: 36.4 (08 Mar 2022 22:46)  T(F): 97.6 (08 Mar 2022 22:46), Max: 97.6 (08 Mar 2022 22:46)  HR: 36 (08 Mar 2022 22:46) (36 - 36)  BP: 173/100 (08 Mar 2022 22:46) (173/100 - 173/100)  BP(mean): --  RR: 18 (08 Mar 2022 22:46) (18 - 18)  SpO2: 99% (08 Mar 2022 22:46) (99% - 99%)    General:  Constitutional: Female, appears stated age, in no apparent distress including pain  Head: Normocephalic & atraumatic.  Respiratory: No increased work of breathing  Extremities: No cyanosis, clubbing, or edema.  Skin: No rashes, bruising, or discoloration.    Neurological (>12):  MS: Awake, alert, oriented to person, place, situation, time. Normal affect. Follows all commands.    Language: Speech is clear, fluent with good repetition & comprehension (able to name objects thumb, mask)    CNs: PERRL (R = 3mm, L = 3mm). VFF. EOMI no nystagmus, no diplopia. V1-3 intact to LT, well developed masseter muscles b/l. No facial asymmetry b/l, full eye closure strength b/l. Hearing grossly normal (rubbing fingers) b/l. Gag reflex deferred. Head turning & shoulder shrug intact b/l. Tongue midline, normal movements, no atrophy.    Motor: Normal muscle bulk & tone. No noticeable tremor or seizure. No pronator drift.              Deltoid	Biceps	Triceps	Wrist	Finger ABd	   R	5	5	5	5	5		5 	  L	5	5	5	5	5		5    	H-Flex	K-Flex	K-Ext	D-Flex	P-Flex  R	5	5	5	5	5	   L	5	5	5	5	5	     Sensation: Intact to LT b/l throughout.     Cortical: Extinction on DSS (neglect): none    Reflexes:              Biceps(C5)       BR(C6)     Triceps(C7)               Patellar(L4)    Achilles(S1)    Plantar Resp  R	3	          3             		        3		    		withdraw  L	3	          3	            		        3		    		wtihdraw    Coordination: No dysmetria to FTN, slight end point intention tremor b/l    Gait: Some postural instability (pt walks with assistive devices)    LABORATORY:    STUDIES & IMAGING:  Studies (EKG, EEG, EMG, etc):     Radiology (XR, CT, MR, U/S, TTE/SU):      ACC: [id number] EXAM: CT BRAIN STROKE PROTOCOL    PROCEDURE DATE: 2022        INTERPRETATION: CLINICAL INDICATION: Stroke code. Transient aphasia.    TECHNIQUE:  Noncontrast CT of the head was performed.  Sagittal and coronal reformats were created.    COMPARISON STUDY: None    FINDINGS:    PARENCHYMA AND VENTRICLES: No acute intracranial hemorrhage, mass effect, or midline shift. Chronic appearing lacunar infarcts of the bilateral lentiform nuclei. No hydrocephalus. Age appropriate involutional changes and moderate small vessel white matter changes. Nonspecific coarse calcifications within the right parietotemporal sulci.  EXTRA-AXIAL: No abnormal extraaxial collection.  PARANASAL SINUSES: Within normal limits.  TYMPANOMASTOID CAVITIES: Within normal limits.  ORBITS: Bilateral cataract surgery.  CALVARIUM: Within normal limits.  MISCELLANEOUS: None    IMPRESSION:  No acute intracranial hemorrhage, mass effect, or evidence of acute vascular territorial infarction If clinical symptoms persist or worsen, more sensitive evaluation with brain MRI may be obtained, if no contraindications exist.   HPI:  87F Creole speaking h/o dementia (possible Lewy-Body), HTN, DM, HLD presents as a stroke code for transient speech disturbance. LKN: 3/8/22 at 19:30. Per daughter, pt had a 10 min episode of garbled speech. Daughter checked /80s, glu >100 and called pt's PCP who told pt to come into the ED. Pt's speech now back to baseline. At baseline, pt has dementia (hallucinates seeing familiar people), uses a cane and is getting a walker, needs help with ADLs (showering, but can feed self). Not on any AC/AP. Pt has been refusing to take certain medications, including her home aspirin. Pt with known iodine allergy and possible contrast reaction in the past.     LKN: 3/8/22 at 19:30  NIHSS: 1  preMRS: 3  Pt is not a candidate for tpa due to mild, non-disabling deficit  Pt is not a candidate for mechanical thrombectomy due to low NIHSS    REVIEW OF SYSTEMS    A 10-system ROS was performed and is negative except for those items noted above and/or in the HPI.    PAST MEDICAL & SURGICAL HISTORY:  HTN (hypertension)    DM (diabetes mellitus) type II, controlled, with peripheral vascular disorder    Hernia, inguinal, right    Umbilical hernia    Diverticulitis    Cardiomyopathy    Hyperlipemia    Glaucoma    Arthritis    Carpal tunnel syndrome    Gout    Lewy body dementia    H/O:     H/O lumpectomy  both breasts cysts    Senile cataracts of both eyes    H/O hysterectomy for benign disease    H/O parathyroidectomy      FAMILY HISTORY:  Family history of stroke or transient ischemic attack in father    Family history of diabetes mellitus      SOCIAL HISTORY:   T/E/D:   Occupation:   Lives with:     MEDICATIONS (HOME):  Home Medications:  Antioxidant Multiple Vitamins and Minerals oral tablet: 1 tab(s) orally once a day (2018 18:50)  Aspir 81 oral delayed release tablet: 1 tab(s) orally once a day (2018 18:50)  Colace 100 mg oral capsule: 1 cap(s) orally 2 times a day (2018 18:50)  Metoprolol Tartrate 25 mg oral tablet: 1 tab(s) orally 2 times a day (2018 18:50)  NexIUM 24HR 20 mg oral delayed release capsule: 1 cap(s) orally once a day (2018 18:50)  NIFEdipine 60 mg oral tablet, extended release: 1 tab(s) orally once a day (2018 18:50)  Prandin 1 mg oral tablet: 1 tab(s) orally 2 times a day (before meals) (2018 18:50)  triamterene-hydrochlorothiazide 37.5 mg-25 mg oral tablet: 1 tab(s) orally every other day (2018 18:50)    MEDICATIONS  (STANDING):    MEDICATIONS  (PRN):    ALLERGIES/INTOLERANCES:  Allergies  angiotensin converting enzyme inhibitors (Unknown)  iodine (Hives)  penicillin (Rash)  pt allergic to cataloupe (Hives)  shellfish (Hives)  Vasotec (Unknown)    Intolerances    VITALS & EXAMINATION:  Vital Signs Last 24 Hrs  T(C): 36.4 (08 Mar 2022 22:46), Max: 36.4 (08 Mar 2022 22:46)  T(F): 97.6 (08 Mar 2022 22:46), Max: 97.6 (08 Mar 2022 22:46)  HR: 36 (08 Mar 2022 22:46) (36 - 36)  BP: 173/100 (08 Mar 2022 22:46) (173/100 - 173/100)  BP(mean): --  RR: 18 (08 Mar 2022 22:46) (18 - 18)  SpO2: 99% (08 Mar 2022 22:46) (99% - 99%)    General:  Constitutional: Female, appears stated age, in no apparent distress including pain  Head: Normocephalic & atraumatic.  Respiratory: No increased work of breathing  Extremities: No cyanosis, clubbing, or edema.  Skin: No rashes, bruising, or discoloration.    Neurological (>12):  MS: Awake, alert, oriented to person, place, situation, not oriented age or month or year. Normal affect. Follows all commands.    Language: Speech is clear, fluent with good repetition & comprehension (able to name objects thumb, mask)    CNs: PERRL (R = 3mm, L = 3mm). VFF. EOMI no nystagmus, no diplopia. V1-3 intact to LT, well developed masseter muscles b/l. No facial asymmetry b/l, full eye closure strength b/l. Hearing grossly normal (rubbing fingers) b/l. Gag reflex deferred. Head turning & shoulder shrug intact b/l. Tongue midline, normal movements, no atrophy.    Motor: Normal muscle bulk & tone. No noticeable tremor or seizure. No pronator drift.              Deltoid	Biceps	Triceps	Wrist	Finger ABd	   R	5	5	5	5	5		5 	  L	5	5	5	5	5		5    	H-Flex	K-Flex	K-Ext	D-Flex	P-Flex  R	5	5	5	5	5	   L	5	5	5	5	5	     Sensation: Intact to LT b/l throughout.     Cortical: Extinction on DSS (neglect): none    Reflexes:              Biceps(C5)       BR(C6)     Triceps(C7)               Patellar(L4)    Achilles(S1)    Plantar Resp  R	3	          3             		        3		    		withdraw  L	3	          3	            		        3		    		wtihdraw    Coordination: No dysmetria to FTN, slight end point intention tremor b/l    Gait: Some postural instability (pt walks with assistive devices)    LABORATORY:    STUDIES & IMAGING:  Studies (EKG, EEG, EMG, etc):     Radiology (XR, CT, MR, U/S, TTE/SU):      ACC: [id number] EXAM: CT BRAIN STROKE PROTOCOL    PROCEDURE DATE: 2022        INTERPRETATION: CLINICAL INDICATION: Stroke code. Transient aphasia.    TECHNIQUE:  Noncontrast CT of the head was performed.  Sagittal and coronal reformats were created.    COMPARISON STUDY: None    FINDINGS:    PARENCHYMA AND VENTRICLES: No acute intracranial hemorrhage, mass effect, or midline shift. Chronic appearing lacunar infarcts of the bilateral lentiform nuclei. No hydrocephalus. Age appropriate involutional changes and moderate small vessel white matter changes. Nonspecific coarse calcifications within the right parietotemporal sulci.  EXTRA-AXIAL: No abnormal extraaxial collection.  PARANASAL SINUSES: Within normal limits.  TYMPANOMASTOID CAVITIES: Within normal limits.  ORBITS: Bilateral cataract surgery.  CALVARIUM: Within normal limits.  MISCELLANEOUS: None    IMPRESSION:  No acute intracranial hemorrhage, mass effect, or evidence of acute vascular territorial infarction If clinical symptoms persist or worsen, more sensitive evaluation with brain MRI may be obtained, if no contraindications exist.   HPI:  88F Creole speaking h/o dementia (possible Lewy-Body), HTN, DM, HLD presents as a stroke code for transient speech disturbance. LKN: 3/8/22 at 19:30. Per daughter, pt had a 10 min episode of garbled speech. Daughter checked /80s, glu >100 and called pt's PCP who told pt to come into the ED. Pt's speech now back to baseline. At baseline, pt has dementia (hallucinates seeing familiar people), uses a cane and is getting a walker, needs help with ADLs (showering, but can feed self). Not on any AC/AP. Pt has been refusing to take certain medications, including her home aspirin. Pt with known iodine allergy and possible contrast reaction in the past.     LKN: 3/8/22 at 19:30  NIHSS: 1  preMRS: 3  Pt is not a candidate for tpa due to mild, non-disabling deficit  Pt is not a candidate for mechanical thrombectomy due to low NIHSS    REVIEW OF SYSTEMS    A 10-system ROS was performed and is negative except for those items noted above and/or in the HPI.    PAST MEDICAL & SURGICAL HISTORY:  HTN (hypertension)    DM (diabetes mellitus) type II, controlled, with peripheral vascular disorder    Hernia, inguinal, right    Umbilical hernia    Diverticulitis    Cardiomyopathy    Hyperlipemia    Glaucoma    Arthritis    Carpal tunnel syndrome    Gout    Lewy body dementia    H/O:     H/O lumpectomy  both breasts cysts    Senile cataracts of both eyes    H/O hysterectomy for benign disease    H/O parathyroidectomy      FAMILY HISTORY:  Family history of stroke or transient ischemic attack in father    Family history of diabetes mellitus      SOCIAL HISTORY:   T/E/D:   Occupation:   Lives with:     MEDICATIONS (HOME):  Home Medications:  Antioxidant Multiple Vitamins and Minerals oral tablet: 1 tab(s) orally once a day (2018 18:50)  Aspir 81 oral delayed release tablet: 1 tab(s) orally once a day (2018 18:50)  Colace 100 mg oral capsule: 1 cap(s) orally 2 times a day (2018 18:50)  Metoprolol Tartrate 25 mg oral tablet: 1 tab(s) orally 2 times a day (2018 18:50)  NexIUM 24HR 20 mg oral delayed release capsule: 1 cap(s) orally once a day (2018 18:50)  NIFEdipine 60 mg oral tablet, extended release: 1 tab(s) orally once a day (2018 18:50)  Prandin 1 mg oral tablet: 1 tab(s) orally 2 times a day (before meals) (2018 18:50)  triamterene-hydrochlorothiazide 37.5 mg-25 mg oral tablet: 1 tab(s) orally every other day (2018 18:50)    MEDICATIONS  (STANDING):    MEDICATIONS  (PRN):    ALLERGIES/INTOLERANCES:  Allergies  angiotensin converting enzyme inhibitors (Unknown)  iodine (Hives)  penicillin (Rash)  pt allergic to cataloupe (Hives)  shellfish (Hives)  Vasotec (Unknown)    Intolerances    VITALS & EXAMINATION:  Vital Signs Last 24 Hrs  T(C): 36.4 (08 Mar 2022 22:46), Max: 36.4 (08 Mar 2022 22:46)  T(F): 97.6 (08 Mar 2022 22:46), Max: 97.6 (08 Mar 2022 22:46)  HR: 36 (08 Mar 2022 22:46) (36 - 36)  BP: 173/100 (08 Mar 2022 22:46) (173/100 - 173/100)  BP(mean): --  RR: 18 (08 Mar 2022 22:46) (18 - 18)  SpO2: 99% (08 Mar 2022 22:46) (99% - 99%)    General:  Constitutional: Female, appears stated age, in no apparent distress including pain  Head: Normocephalic & atraumatic.  Respiratory: No increased work of breathing  Extremities: No cyanosis, clubbing, or edema.  Skin: No rashes, bruising, or discoloration.    Neurological (>12):  MS: Awake, alert, oriented to person, place, situation, not oriented age or month or year. Normal affect. Follows all commands.    Language: Speech is clear, fluent with good repetition & comprehension (able to name objects thumb, mask)    CNs: PERRL (R = 3mm, L = 3mm). VFF. EOMI no nystagmus, no diplopia. V1-3 intact to LT, well developed masseter muscles b/l. No facial asymmetry b/l, full eye closure strength b/l. Hearing grossly normal (rubbing fingers) b/l. Gag reflex deferred. Head turning & shoulder shrug intact b/l. Tongue midline, normal movements, no atrophy.    Motor: Normal muscle bulk & tone. No noticeable tremor or seizure. No pronator drift.              Deltoid	Biceps	Triceps	Wrist	Finger ABd	   R	5	5	5	5	5		5 	  L	5	5	5	5	5		5    	H-Flex	K-Flex	K-Ext	D-Flex	P-Flex  R	5	5	5	5	5	   L	5	5	5	5	5	     Sensation: Intact to LT b/l throughout.     Cortical: Extinction on DSS (neglect): none    Reflexes:              Biceps(C5)       BR(C6)     Triceps(C7)               Patellar(L4)    Achilles(S1)    Plantar Resp  R	3	          3             		        3		    		withdraw  L	3	          3	            		        3		    		wtihdraw    Coordination: No dysmetria to FTN, slight end point intention tremor b/l    Gait: Some postural instability (pt walks with assistive devices)    LABORATORY:    STUDIES & IMAGING:  Studies (EKG, EEG, EMG, etc):     Radiology (XR, CT, MR, U/S, TTE/SU):      ACC: [id number] EXAM: CT BRAIN STROKE PROTOCOL    PROCEDURE DATE: 2022        INTERPRETATION: CLINICAL INDICATION: Stroke code. Transient aphasia.    TECHNIQUE:  Noncontrast CT of the head was performed.  Sagittal and coronal reformats were created.    COMPARISON STUDY: None    FINDINGS:    PARENCHYMA AND VENTRICLES: No acute intracranial hemorrhage, mass effect, or midline shift. Chronic appearing lacunar infarcts of the bilateral lentiform nuclei. No hydrocephalus. Age appropriate involutional changes and moderate small vessel white matter changes. Nonspecific coarse calcifications within the right parietotemporal sulci.  EXTRA-AXIAL: No abnormal extraaxial collection.  PARANASAL SINUSES: Within normal limits.  TYMPANOMASTOID CAVITIES: Within normal limits.  ORBITS: Bilateral cataract surgery.  CALVARIUM: Within normal limits.  MISCELLANEOUS: None    IMPRESSION:  No acute intracranial hemorrhage, mass effect, or evidence of acute vascular territorial infarction If clinical symptoms persist or worsen, more sensitive evaluation with brain MRI may be obtained, if no contraindications exist.

## 2022-03-09 VITALS
HEART RATE: 62 BPM | DIASTOLIC BLOOD PRESSURE: 83 MMHG | TEMPERATURE: 98 F | SYSTOLIC BLOOD PRESSURE: 191 MMHG | RESPIRATION RATE: 15 BRPM | OXYGEN SATURATION: 98 %

## 2022-03-09 PROBLEM — G31.83 NEUROCOGNITIVE DISORDER WITH LEWY BODIES: Chronic | Status: ACTIVE | Noted: 2021-09-25

## 2022-03-09 LAB
APPEARANCE UR: CLEAR — SIGNIFICANT CHANGE UP
BACTERIA # UR AUTO: NEGATIVE — SIGNIFICANT CHANGE UP
BILIRUB UR-MCNC: NEGATIVE — SIGNIFICANT CHANGE UP
COLOR SPEC: SIGNIFICANT CHANGE UP
DIFF PNL FLD: NEGATIVE — SIGNIFICANT CHANGE UP
EPI CELLS # UR: 3 /HPF — SIGNIFICANT CHANGE UP
GLUCOSE UR QL: NEGATIVE — SIGNIFICANT CHANGE UP
HYALINE CASTS # UR AUTO: 0 /LPF — SIGNIFICANT CHANGE UP (ref 0–2)
KETONES UR-MCNC: NEGATIVE — SIGNIFICANT CHANGE UP
LEUKOCYTE ESTERASE UR-ACNC: NEGATIVE — SIGNIFICANT CHANGE UP
NITRITE UR-MCNC: NEGATIVE — SIGNIFICANT CHANGE UP
PH UR: 6.5 — SIGNIFICANT CHANGE UP (ref 5–8)
PROT UR-MCNC: SIGNIFICANT CHANGE UP
RBC CASTS # UR COMP ASSIST: 3 /HPF — SIGNIFICANT CHANGE UP (ref 0–4)
SP GR SPEC: 1.01 — SIGNIFICANT CHANGE UP (ref 1.01–1.02)
UROBILINOGEN FLD QL: NEGATIVE — SIGNIFICANT CHANGE UP
WBC UR QL: 1 /HPF — SIGNIFICANT CHANGE UP (ref 0–5)

## 2022-03-09 PROCEDURE — 82962 GLUCOSE BLOOD TEST: CPT

## 2022-03-09 PROCEDURE — 82803 BLOOD GASES ANY COMBINATION: CPT

## 2022-03-09 PROCEDURE — 70450 CT HEAD/BRAIN W/O DYE: CPT | Mod: MA

## 2022-03-09 PROCEDURE — 84132 ASSAY OF SERUM POTASSIUM: CPT

## 2022-03-09 PROCEDURE — 99285 EMERGENCY DEPT VISIT HI MDM: CPT | Mod: 25

## 2022-03-09 PROCEDURE — 72170 X-RAY EXAM OF PELVIS: CPT

## 2022-03-09 PROCEDURE — 80053 COMPREHEN METABOLIC PANEL: CPT

## 2022-03-09 PROCEDURE — 85018 HEMOGLOBIN: CPT

## 2022-03-09 PROCEDURE — 82947 ASSAY GLUCOSE BLOOD QUANT: CPT

## 2022-03-09 PROCEDURE — 36415 COLL VENOUS BLD VENIPUNCTURE: CPT

## 2022-03-09 PROCEDURE — 82565 ASSAY OF CREATININE: CPT

## 2022-03-09 PROCEDURE — 93005 ELECTROCARDIOGRAM TRACING: CPT

## 2022-03-09 PROCEDURE — 83605 ASSAY OF LACTIC ACID: CPT

## 2022-03-09 PROCEDURE — 85730 THROMBOPLASTIN TIME PARTIAL: CPT

## 2022-03-09 PROCEDURE — 82435 ASSAY OF BLOOD CHLORIDE: CPT

## 2022-03-09 PROCEDURE — 72170 X-RAY EXAM OF PELVIS: CPT | Mod: 26

## 2022-03-09 PROCEDURE — 81001 URINALYSIS AUTO W/SCOPE: CPT

## 2022-03-09 PROCEDURE — 85025 COMPLETE CBC W/AUTO DIFF WBC: CPT

## 2022-03-09 PROCEDURE — 82330 ASSAY OF CALCIUM: CPT

## 2022-03-09 PROCEDURE — 85014 HEMATOCRIT: CPT

## 2022-03-09 PROCEDURE — 84484 ASSAY OF TROPONIN QUANT: CPT

## 2022-03-09 PROCEDURE — 87086 URINE CULTURE/COLONY COUNT: CPT

## 2022-03-09 PROCEDURE — 84295 ASSAY OF SERUM SODIUM: CPT

## 2022-03-09 PROCEDURE — 85610 PROTHROMBIN TIME: CPT

## 2022-03-09 PROCEDURE — 71045 X-RAY EXAM CHEST 1 VIEW: CPT | Mod: 26

## 2022-03-09 PROCEDURE — 71045 X-RAY EXAM CHEST 1 VIEW: CPT

## 2022-03-09 RX ORDER — CLOPIDOGREL BISULFATE 75 MG/1
1 TABLET, FILM COATED ORAL
Qty: 20 | Refills: 0
Start: 2022-03-09 | End: 2022-03-28

## 2022-03-09 RX ORDER — ATORVASTATIN CALCIUM 80 MG/1
1 TABLET, FILM COATED ORAL
Qty: 30 | Refills: 0
Start: 2022-03-09 | End: 2022-04-07

## 2022-03-09 RX ORDER — CLOPIDOGREL BISULFATE 75 MG/1
75 TABLET, FILM COATED ORAL ONCE
Refills: 0 | Status: COMPLETED | OUTPATIENT
Start: 2022-03-09 | End: 2022-03-09

## 2022-03-09 RX ORDER — ASPIRIN/CALCIUM CARB/MAGNESIUM 324 MG
81 TABLET ORAL ONCE
Refills: 0 | Status: COMPLETED | OUTPATIENT
Start: 2022-03-09 | End: 2022-03-09

## 2022-03-09 RX ORDER — ASPIRIN/CALCIUM CARB/MAGNESIUM 324 MG
1 TABLET ORAL
Qty: 30 | Refills: 0
Start: 2022-03-09 | End: 2022-04-07

## 2022-03-09 RX ORDER — ATORVASTATIN CALCIUM 80 MG/1
40 TABLET, FILM COATED ORAL ONCE
Refills: 0 | Status: COMPLETED | OUTPATIENT
Start: 2022-03-09 | End: 2022-03-09

## 2022-03-09 RX ADMIN — ATORVASTATIN CALCIUM 40 MILLIGRAM(S): 80 TABLET, FILM COATED ORAL at 03:07

## 2022-03-09 RX ADMIN — CLOPIDOGREL BISULFATE 75 MILLIGRAM(S): 75 TABLET, FILM COATED ORAL at 03:08

## 2022-03-09 RX ADMIN — Medication 81 MILLIGRAM(S): at 03:07

## 2022-03-10 LAB
CULTURE RESULTS: SIGNIFICANT CHANGE UP
SPECIMEN SOURCE: SIGNIFICANT CHANGE UP

## 2022-04-21 ENCOUNTER — EMERGENCY (EMERGENCY)
Facility: HOSPITAL | Age: 87
LOS: 1 days | Discharge: LEFT BEFORE DISCHARGE | End: 2022-04-21
Attending: EMERGENCY MEDICINE
Payer: MEDICARE

## 2022-04-21 ENCOUNTER — TRANSCRIPTION ENCOUNTER (OUTPATIENT)
Age: 87
End: 2022-04-21

## 2022-04-21 VITALS
RESPIRATION RATE: 18 BRPM | OXYGEN SATURATION: 98 % | HEART RATE: 86 BPM | SYSTOLIC BLOOD PRESSURE: 156 MMHG | DIASTOLIC BLOOD PRESSURE: 77 MMHG

## 2022-04-21 VITALS
WEIGHT: 175.05 LBS | RESPIRATION RATE: 20 BRPM | SYSTOLIC BLOOD PRESSURE: 164 MMHG | TEMPERATURE: 98 F | DIASTOLIC BLOOD PRESSURE: 89 MMHG | OXYGEN SATURATION: 97 % | HEART RATE: 65 BPM | HEIGHT: 65 IN

## 2022-04-21 DIAGNOSIS — Z98.890 OTHER SPECIFIED POSTPROCEDURAL STATES: Chronic | ICD-10-CM

## 2022-04-21 DIAGNOSIS — Z98.891 HISTORY OF UTERINE SCAR FROM PREVIOUS SURGERY: Chronic | ICD-10-CM

## 2022-04-21 DIAGNOSIS — Z90.710 ACQUIRED ABSENCE OF BOTH CERVIX AND UTERUS: Chronic | ICD-10-CM

## 2022-04-21 DIAGNOSIS — H25.9 UNSPECIFIED AGE-RELATED CATARACT: Chronic | ICD-10-CM

## 2022-04-21 LAB
ALBUMIN SERPL ELPH-MCNC: 4 G/DL — SIGNIFICANT CHANGE UP (ref 3.3–5)
ALP SERPL-CCNC: 56 U/L — SIGNIFICANT CHANGE UP (ref 40–120)
ALT FLD-CCNC: 11 U/L — SIGNIFICANT CHANGE UP (ref 10–45)
ANION GAP SERPL CALC-SCNC: 13 MMOL/L — SIGNIFICANT CHANGE UP (ref 5–17)
AST SERPL-CCNC: 16 U/L — SIGNIFICANT CHANGE UP (ref 10–40)
BASOPHILS # BLD AUTO: 0.06 K/UL — SIGNIFICANT CHANGE UP (ref 0–0.2)
BASOPHILS NFR BLD AUTO: 0.8 % — SIGNIFICANT CHANGE UP (ref 0–2)
BILIRUB SERPL-MCNC: 0.5 MG/DL — SIGNIFICANT CHANGE UP (ref 0.2–1.2)
BUN SERPL-MCNC: 19 MG/DL — SIGNIFICANT CHANGE UP (ref 7–23)
CALCIUM SERPL-MCNC: 10 MG/DL — SIGNIFICANT CHANGE UP (ref 8.4–10.5)
CHLORIDE SERPL-SCNC: 107 MMOL/L — SIGNIFICANT CHANGE UP (ref 96–108)
CO2 SERPL-SCNC: 24 MMOL/L — SIGNIFICANT CHANGE UP (ref 22–31)
CREAT SERPL-MCNC: 0.87 MG/DL — SIGNIFICANT CHANGE UP (ref 0.5–1.3)
EGFR: 64 ML/MIN/1.73M2 — SIGNIFICANT CHANGE UP
EOSINOPHIL # BLD AUTO: 0.09 K/UL — SIGNIFICANT CHANGE UP (ref 0–0.5)
EOSINOPHIL NFR BLD AUTO: 1.2 % — SIGNIFICANT CHANGE UP (ref 0–6)
GLUCOSE SERPL-MCNC: 115 MG/DL — HIGH (ref 70–99)
HCT VFR BLD CALC: 34.4 % — LOW (ref 34.5–45)
HGB BLD-MCNC: 10.7 G/DL — LOW (ref 11.5–15.5)
IMM GRANULOCYTES NFR BLD AUTO: 0.4 % — SIGNIFICANT CHANGE UP (ref 0–1.5)
LYMPHOCYTES # BLD AUTO: 2.22 K/UL — SIGNIFICANT CHANGE UP (ref 1–3.3)
LYMPHOCYTES # BLD AUTO: 30.2 % — SIGNIFICANT CHANGE UP (ref 13–44)
MCHC RBC-ENTMCNC: 24.2 PG — LOW (ref 27–34)
MCHC RBC-ENTMCNC: 31.1 GM/DL — LOW (ref 32–36)
MCV RBC AUTO: 77.8 FL — LOW (ref 80–100)
MONOCYTES # BLD AUTO: 0.82 K/UL — SIGNIFICANT CHANGE UP (ref 0–0.9)
MONOCYTES NFR BLD AUTO: 11.1 % — SIGNIFICANT CHANGE UP (ref 2–14)
NEUTROPHILS # BLD AUTO: 4.14 K/UL — SIGNIFICANT CHANGE UP (ref 1.8–7.4)
NEUTROPHILS NFR BLD AUTO: 56.3 % — SIGNIFICANT CHANGE UP (ref 43–77)
NRBC # BLD: 0 /100 WBCS — SIGNIFICANT CHANGE UP (ref 0–0)
PLATELET # BLD AUTO: 171 K/UL — SIGNIFICANT CHANGE UP (ref 150–400)
POTASSIUM SERPL-MCNC: 4 MMOL/L — SIGNIFICANT CHANGE UP (ref 3.5–5.3)
POTASSIUM SERPL-SCNC: 4 MMOL/L — SIGNIFICANT CHANGE UP (ref 3.5–5.3)
PROT SERPL-MCNC: 6.4 G/DL — SIGNIFICANT CHANGE UP (ref 6–8.3)
RBC # BLD: 4.42 M/UL — SIGNIFICANT CHANGE UP (ref 3.8–5.2)
RBC # FLD: 14.8 % — HIGH (ref 10.3–14.5)
SODIUM SERPL-SCNC: 144 MMOL/L — SIGNIFICANT CHANGE UP (ref 135–145)
TROPONIN T, HIGH SENSITIVITY RESULT: 14 NG/L — SIGNIFICANT CHANGE UP (ref 0–51)
WBC # BLD: 7.36 K/UL — SIGNIFICANT CHANGE UP (ref 3.8–10.5)
WBC # FLD AUTO: 7.36 K/UL — SIGNIFICANT CHANGE UP (ref 3.8–10.5)

## 2022-04-21 PROCEDURE — 70450 CT HEAD/BRAIN W/O DYE: CPT | Mod: 26,MA

## 2022-04-21 PROCEDURE — 72125 CT NECK SPINE W/O DYE: CPT | Mod: 26,MA

## 2022-04-21 PROCEDURE — 76377 3D RENDER W/INTRP POSTPROCES: CPT | Mod: 26

## 2022-04-21 PROCEDURE — 99285 EMERGENCY DEPT VISIT HI MDM: CPT

## 2022-04-21 PROCEDURE — 71250 CT THORAX DX C-: CPT | Mod: 26,MA

## 2022-04-21 PROCEDURE — 74176 CT ABD & PELVIS W/O CONTRAST: CPT | Mod: 26,MA

## 2022-04-21 PROCEDURE — 70486 CT MAXILLOFACIAL W/O DYE: CPT | Mod: 26,MA

## 2022-04-21 PROCEDURE — 71045 X-RAY EXAM CHEST 1 VIEW: CPT | Mod: 26

## 2022-04-21 PROCEDURE — 72170 X-RAY EXAM OF PELVIS: CPT | Mod: 26

## 2022-04-21 RX ORDER — SODIUM CHLORIDE 9 MG/ML
250 INJECTION INTRAMUSCULAR; INTRAVENOUS; SUBCUTANEOUS ONCE
Refills: 0 | Status: DISCONTINUED | OUTPATIENT
Start: 2022-04-21 | End: 2022-04-21

## 2022-04-21 RX ORDER — SODIUM CHLORIDE 9 MG/ML
1000 INJECTION INTRAMUSCULAR; INTRAVENOUS; SUBCUTANEOUS ONCE
Refills: 0 | Status: COMPLETED | OUTPATIENT
Start: 2022-04-21 | End: 2022-04-21

## 2022-04-21 RX ORDER — ACETAMINOPHEN 500 MG
1000 TABLET ORAL ONCE
Refills: 0 | Status: COMPLETED | OUTPATIENT
Start: 2022-04-21 | End: 2022-04-21

## 2022-04-21 NOTE — ED PROVIDER NOTE - NS ED ROS FT
CONST: no fevers, no chills  EYES: no pain, no vision changes  ENT: no sore throat, no ear pain, no change in hearing  CV: no chest pain, no leg swelling  RESP: no shortness of breath, no cough  ABD: no abdominal pain, no nausea, no vomiting, no diarrhea  : no dysuria, no flank pain, no hematuria  MSK: no back pain, no extremity pain  NEURO: no headache or additional neurologic complaints  HEME: no easy bleeding  SKIN:  no rash, +contusion R face

## 2022-04-21 NOTE — ED PROVIDER NOTE - PROGRESS NOTE DETAILS
Endorsed to Dr ROLF Nazario MD, [facility] PGY-2: Discussed results with patient and daughter who is at bedside. Explained trauma workup negative, however cannot definitively r/o cardiac syncope given patient's history and prior cardiac cath. Explained patient would be leaving against medical advice, without full workup, and is at risk of decompensating without full workup, including but not limited to recurrent fall, MI, death. Patient and family expressed understanding of risks. Prefer to follow up OP with cardiologist.

## 2022-04-21 NOTE — ED PROVIDER NOTE - PATIENT PORTAL LINK FT
You can access the FollowMyHealth Patient Portal offered by [facility] by registering at the following website: [email or web address]. By joining [facility]’s FollowMyHealth portal, you will also be able to view your health information using other applications (apps) compatible with our system.

## 2022-04-21 NOTE — ED PROVIDER NOTE - CLINICAL SUMMARY MEDICAL DECISION MAKING FREE TEXT BOX
88 year old F with PMH CAD, Arthritis, Diverticulitis, DMT2,Gout, HTN, HLD, Lewy body dementia presenting after an unwitnessed fall at home. VSS, GCS 15, non focal neurological exam. Large contusion to R maxilla with lower eyelid swelling. No vision changes. No pelvic instability. Less likely mechanical trip and fall. Concern for cardiac syncope given patient's history. Will get pan scan, coags, reverse AC if necessary, EKG, trops. Admit for syncope w/u.

## 2022-04-21 NOTE — ED PROVIDER NOTE - ATTENDING CONTRIBUTION TO CARE
Private Physician Fiztclaude Grant PCP/[facility]  88y female pmh Cad, Arthritis, Diverticulitis, DMT2,Gout, HTN,HLD,  Lewy body dementia. PT comes to ed from home. PT had unwitnessed fall at home. Family heard her fall and was found awake alert. Pt unsure why she fell getting up from bed. Was seen UC and referred to ED. Private Physician Fiztclaude Grant PCP/[facility]ina [facility]  88y female pmh Cad, Arthritis, Diverticulitis, DMT2,Gout, HTN,HLD,  Lewy body dementia. PT comes to ed from home. PT had unwitnessed fall at home. Family heard her fall and was found awake alert. Pt unsure why she fell getting up from bed. Was seen UC and referred to ED. Pt complains of pain rt face, PE WDWN female awake alert NC+rt infraorbital ecchmosis/hematoma, eom intact, Neck supple chest clear anterior & posterior cv no rubs, gallops or murmurs abd soft +bs neuro gcs 15 speech fluent power 5/5 all extr  Tim Nazario MD, Facep

## 2022-04-21 NOTE — ED PROVIDER NOTE - PHYSICAL EXAMINATION
GENERAL: Awake, alert, NAD  HEENT: NC/AT, moist mucous membranes, PERRL, EOMI. Large contusion to R maxilla with lower lid edema.   LUNGS: CTAB, no wheezes or crackles   CHEST: No chest wall or rib tenderness.   CARDIAC: RRR, no m/r/g  ABDOMEN: Soft, normal BS, non tender, non distended, no rebound, no guarding  BACK: No midline spinal tenderness, no CVA tenderness  EXT: No edema, no calf tenderness, 2+ DP pulses bilaterally, no deformities. No pelvic instability.   NEURO: A&Ox3. Moving all extremities. 5/5 strength UE and LE bilaterally, sensation intact. CN III-XII grossly intact. GCS 15.   SKIN: Warm and dry. No rash.  PSYCH: Normal affect.

## 2022-04-21 NOTE — ED PROVIDER NOTE - OBJECTIVE STATEMENT
88n female with pmh dementia, HTN, DM, HLD presenting after an unwitnessed fall. Patient takes aspirin and plavix. 88 year old F with PMH CAD, Arthritis, Diverticulitis, DMT2,Gout, HTN, HLD, Lewy body dementia presenting after an unwitnessed fall at home. Patient was in bedroom. Got up to use bathroom and lost consciousness. Family members heard fall and saw her on the floor. Patient takes aspirin and plavix. Sustained hematoma to R maxilla. Reports no CP, SOB or palpitations. No other MSK pain. Seen at [facility] and referred here. Patient alert immediately after and at baseline currently. Of note, patient had cardiac catheterization in 2018 with significant stenosis (Proximal cx 75 % stenosis, Mid LAD 50% stenosis, distal circumflex 85% stenosis). EF 55%. However did not have PCI at that time.

## 2022-04-21 NOTE — ED PROVIDER NOTE - NSFOLLOWUPINSTRUCTIONS_ED_ALL_ED_FT
You are leaving against medical advice without a full evaluation.    Please follow up with your cardiologist as soon as possible. We recommend getting an echocardiogram and discussion of a repeat cardiac catheterization or stress test.     Please return to the emergency department with any new or worsening symptoms, including:  -Severe headache  -Vomiting  -Chest pain  -Shortness of breath  -You are confused or faint      [facility]  Cardiology  [address] [place] [address]  Phone: [phone]  Fax:   Follow Up Time:     [facility]  Cardiology  [address], [address]  [place] [address]  Phone: [phone]  Fax:   Follow Up Time:     [facility]  Cardiology  [address] [place] [address]  Phone: [phone]  Fax:   Follow Up Time:     Near Syncope    WHAT YOU NEED TO KNOW:    Near syncope, also called presyncope, is the feeling that you may faint (lose consciousness), but you do not. You can control some health conditions that cause near syncope. Your healthcare providers can help you create a plan to manage near syncope and prevent episodes.    DISCHARGE INSTRUCTIONS:    Seek care immediately if:    You have sudden chest pain.    You have trouble breathing or shortness of breath.    You have vision changes, are sweating, and have nausea while you are sitting or lying down.    You feel dizzy or flushed and your heart is fluttering.    You lose consciousness.    You cannot use your arm, hand, foot, or leg, or it feels weak.    You have trouble speaking or understanding others when they speak.  Contact your healthcare provider if:    You have new or worsening symptoms.    Your heart beats faster or slower than usual.    You have questions or concerns about your condition or care.  Medicines:    Medicines may be needed to help your heart pump strongly and regularly. Your healthcare provider may also make changes to any medicines that are causing syncope.    Take your medicine as directed. Contact your healthcare provider if you think your medicine is not helping or if you have side effects. Tell him or her if you are allergic to any medicine. Keep a list of the medicines, vitamins, and herbs you take. Include the amounts, and when and why you take them. Bring the list or the pill bottles to follow-up visits. Carry your medicine list with you in case of an emergency.  Follow up with your healthcare provider as directed: You may need more tests to help find the cause of your near syncope episodes. The tests will help healthcare providers plan the best treatment for you. Write down your questions so you remember to ask them during your visits.    Manage near syncope:    Sit or lie down when needed. This includes when you feel dizzy, your throat is getting tight, and your vision changes. Raise your legs above the level of your heart.    Take slow, deep breaths if you start to breathe faster with anxiety or fear. This can help decrease dizziness and the feeling that you might faint.    Keep a record of your near syncope episodes. Include your symptoms and your activity before and after the episode. The record can help your healthcare provider find the cause of your near syncope and help you manage episodes.  Prevent a near syncope episode:    Move slowly and let yourself get used to one position before you move to another position. This is very important when you change from a lying or sitting position to a standing position. Take some deep breaths before you stand up from a lying position. Stand up slowly. Sudden movements may cause a fainting spell. Sit on the side of the bed or couch for a few minutes before you stand up. If you are on bedrest, try to be upright for about 2 hours each day, or as directed. Do not lock your legs if you are standing for a long period of time. Move your legs and bend your knees to keep blood flowing.    Follow your healthcare provider's recommendations. Your provider may recommend that you drink more liquids to prevent dehydration. You may also need to have more salt to keep your blood pressure from dropping too low and causing syncope. Your provider will tell you how much liquid and sodium to have each day. He or she will also tell you how much physical activity is safe for you. This will depend on what is causing your near syncope.    Watch for signs of low blood sugar. These include hunger, nervousness, sweating, and fast or fluttery heartbeats. Talk with your healthcare provider about ways to keep your blood sugar level steady.    Check your blood pressure often. This is important if you take medicine to lower your blood pressure. Check your blood pressure when you are lying down and when you are standing. Ask how often to check during the day. Keep a record of your blood pressure numbers. Your healthcare provider may use the record to help plan your treatment.  How to take a Blood Pressure      Do not strain if you are constipated. You may faint if you strain to have a bowel movement. Walking is the best way to get your bowels moving. Eat foods high in fiber to make it easier to have a bowel movement. Good examples are high-fiber cereals, beans, vegetables, and whole-grain breads. Prune juice may help make bowel movements softer.

## 2022-04-22 LAB
APTT BLD: 26.4 SEC — LOW (ref 27.5–35.5)
FLUAV AG NPH QL: SIGNIFICANT CHANGE UP
FLUBV AG NPH QL: SIGNIFICANT CHANGE UP
INR BLD: 1.03 RATIO — SIGNIFICANT CHANGE UP (ref 0.88–1.16)
PROTHROM AB SERPL-ACNC: 11.9 SEC — SIGNIFICANT CHANGE UP (ref 10.5–13.4)
RSV RNA NPH QL NAA+NON-PROBE: SIGNIFICANT CHANGE UP
SARS-COV-2 RNA SPEC QL NAA+PROBE: SIGNIFICANT CHANGE UP
TROPONIN T, HIGH SENSITIVITY RESULT: 14 NG/L — SIGNIFICANT CHANGE UP (ref 0–51)

## 2022-04-22 PROCEDURE — 93005 ELECTROCARDIOGRAM TRACING: CPT

## 2022-04-22 PROCEDURE — 99285 EMERGENCY DEPT VISIT HI MDM: CPT | Mod: 25

## 2022-04-22 PROCEDURE — 70450 CT HEAD/BRAIN W/O DYE: CPT | Mod: MA

## 2022-04-22 PROCEDURE — 85730 THROMBOPLASTIN TIME PARTIAL: CPT

## 2022-04-22 PROCEDURE — 71045 X-RAY EXAM CHEST 1 VIEW: CPT

## 2022-04-22 PROCEDURE — 83735 ASSAY OF MAGNESIUM: CPT

## 2022-04-22 PROCEDURE — 80053 COMPREHEN METABOLIC PANEL: CPT

## 2022-04-22 PROCEDURE — 76377 3D RENDER W/INTRP POSTPROCES: CPT

## 2022-04-22 PROCEDURE — 85025 COMPLETE CBC W/AUTO DIFF WBC: CPT

## 2022-04-22 PROCEDURE — 85610 PROTHROMBIN TIME: CPT

## 2022-04-22 PROCEDURE — 71250 CT THORAX DX C-: CPT | Mod: MA

## 2022-04-22 PROCEDURE — 70486 CT MAXILLOFACIAL W/O DYE: CPT | Mod: MA

## 2022-04-22 PROCEDURE — 36415 COLL VENOUS BLD VENIPUNCTURE: CPT

## 2022-04-22 PROCEDURE — 72170 X-RAY EXAM OF PELVIS: CPT

## 2022-04-22 PROCEDURE — 72125 CT NECK SPINE W/O DYE: CPT | Mod: MA

## 2022-04-22 PROCEDURE — 84484 ASSAY OF TROPONIN QUANT: CPT

## 2022-04-22 PROCEDURE — 74176 CT ABD & PELVIS W/O CONTRAST: CPT | Mod: MA

## 2022-04-22 PROCEDURE — 87637 SARSCOV2&INF A&B&RSV AMP PRB: CPT

## 2022-04-22 NOTE — ED ADULT NURSE NOTE - OBJECTIVE STATEMENT
88y female, AAOx3, breathing even and unlabored, pmh dementia, HTN, DM, HLD presenting after an unwitnessed fall. Patient takes aspirin and plavix. pt denies HA, chest pain, shortness of breath, abd pain, moves all extremities w/+ pulses, right eye swollen, no changes in vision, 20g placed right ac, pt on CM, bed in lowest position, comfort and safety provided,

## 2022-04-22 NOTE — ED ADULT NURSE NOTE - NSIMPLEMENTINTERV_GEN_ALL_ED
Implemented All Fall with Harm Risk Interventions:  [place] to call system. Call bell, personal items and telephone within reach. Instruct patient to call for assistance. Room bathroom lighting operational. Non-slip footwear when patient is off stretcher. Physically safe environment: no spills, clutter or unnecessary equipment. Stretcher in lowest position, wheels locked, appropriate side rails in place. Provide visual cue, wrist band, yellow gown, etc. Monitor gait and stability. Monitor for mental status changes and reorient to person, place, and time. Review medications for side effects contributing to fall risk. Reinforce activity limits and safety measures with patient and family. Provide visual clues: red socks.

## 2022-06-06 ENCOUNTER — EMERGENCY (EMERGENCY)
Facility: HOSPITAL | Age: 87
LOS: 1 days | Discharge: HOME | End: 2022-06-06
Attending: EMERGENCY MEDICINE
Payer: MEDICARE

## 2022-06-06 VITALS
HEART RATE: 72 BPM | TEMPERATURE: 98 F | DIASTOLIC BLOOD PRESSURE: 74 MMHG | RESPIRATION RATE: 18 BRPM | SYSTOLIC BLOOD PRESSURE: 159 MMHG | OXYGEN SATURATION: 97 %

## 2022-06-06 VITALS
OXYGEN SATURATION: 95 % | HEART RATE: 80 BPM | RESPIRATION RATE: 18 BRPM | SYSTOLIC BLOOD PRESSURE: 173 MMHG | DIASTOLIC BLOOD PRESSURE: 79 MMHG | WEIGHT: 179.9 LBS | HEIGHT: 65 IN | TEMPERATURE: 98 F

## 2022-06-06 DIAGNOSIS — Z98.891 HISTORY OF UTERINE SCAR FROM PREVIOUS SURGERY: Chronic | ICD-10-CM

## 2022-06-06 DIAGNOSIS — Z98.890 OTHER SPECIFIED POSTPROCEDURAL STATES: Chronic | ICD-10-CM

## 2022-06-06 DIAGNOSIS — H25.9 UNSPECIFIED AGE-RELATED CATARACT: Chronic | ICD-10-CM

## 2022-06-06 DIAGNOSIS — Z90.710 ACQUIRED ABSENCE OF BOTH CERVIX AND UTERUS: Chronic | ICD-10-CM

## 2022-06-06 LAB — SARS-COV-2 RNA SPEC QL NAA+PROBE: SIGNIFICANT CHANGE UP

## 2022-06-06 PROCEDURE — 70450 CT HEAD/BRAIN W/O DYE: CPT | Mod: 26,ME

## 2022-06-06 PROCEDURE — 99284 EMERGENCY DEPT VISIT MOD MDM: CPT

## 2022-06-06 PROCEDURE — U0005: CPT

## 2022-06-06 PROCEDURE — G1004: CPT

## 2022-06-06 PROCEDURE — 70450 CT HEAD/BRAIN W/O DYE: CPT | Mod: ME

## 2022-06-06 PROCEDURE — 99284 EMERGENCY DEPT VISIT MOD MDM: CPT | Mod: 25

## 2022-06-06 PROCEDURE — U0003: CPT

## 2022-06-06 RX ORDER — BNT162B2 0.23 MG/2.25ML
0.3 INJECTION, SUSPENSION INTRAMUSCULAR ONCE
Refills: 0 | Status: DISCONTINUED | OUTPATIENT
Start: 2022-06-06 | End: 2022-06-10

## 2022-06-06 NOTE — ED PROVIDER NOTE - NS ED ATTENDING STATEMENT MOD
This was a shared visit with the HANK. I reviewed and verified the documentation and independently performed the documented:

## 2022-06-06 NOTE — ED PROVIDER NOTE - ATTENDING APP SHARED VISIT CONTRIBUTION OF CARE
88F hx of dementia on plavix here with fall at home w head strike, no LOC. Fall occurred around noon today, tripped on pants which were around ankles when got up from toilet. Daughter was outside door and immediately responded. Dtr present w her in ED now. Mild HA, no vision changes. No NV. No change in mentation. L shoulder pain w/o deformity or dec ROM. Hematoma and superficial abrasion to L forehead. Awake alert. CN intact. No FND. No c/o neck pain and no midline TTP. CTH was completed and is w/o ICH or fracture. DO not suspect L shoulder fracture given FROM and no deformity. Took APAP PTA and declines further pain relief measures such as ice, addtl meds. Stable fro DC w dtr. Return precautions discussed.

## 2022-06-06 NOTE — ED ADULT TRIAGE NOTE - CHIEF COMPLAINT QUOTE
fall ,  hematoma l forehead,  Does not think she passed out, L shoulder pain HX Dementia was going to the BR IS on Plavix Pt responds approp to verbal and tactile stimuli

## 2022-06-06 NOTE — ED PROVIDER NOTE - PATIENT PORTAL LINK FT
You can access the FollowMyHealth Patient Portal offered by [facility] by registering at the following website: [email or web address]. By joining [employer]’s FollowMyHealth portal, you will also be able to view your health information using other applications (apps) compatible with our system.

## 2022-06-06 NOTE — ED PROVIDER NOTE - RAPID ASSESSMENT
88 F w/ PMHx of dementia p/w hematoma on forehead and L shoulder bruising s/p fall today striking head, believed to be a trip. Pt is scheduled to have a stress test soon. Pt is accompanied by daughter. Of note, pt has requested a PCR test.    **Pt seen in the waiting room via teletriage by Trae Forte (MD), documentation completed by Nicole -Buddy Nolan. Pt to be sent to main ED for further evaluation - all orders placed to be followed by MD in the main ED**   Scribe Statement: Ovidio PINK Cole (scribe), attest that this documentation has been prepared under the direction and in the presence of Trae Forte (MD) 88 F w/ PMHx of dementia p/w hematoma on forehead and L shoulder bruising s/p fall today striking head, believed to be a trip. Pt is scheduled to have a stress test soon. Pt is accompanied by daughter. Of note, pt has requested a PCR test.    **Pt seen in the waiting room via teletriage by Trae Forte (MD), documentation completed by Nicole -Buddy Nolan. Pt to be sent to main ED for further evaluation - all orders placed to be followed by MD in the main ED**   Scribe Statement: Ovidio PINK Cole (scribe), attest that this documentation has been prepared under the direction and in the presence of Trae Forte (MD)  Attending Note --     I saw the patient waiting area via televideo connection; a brief history was taken and a thorough physical exam was not performed as there is no physical exam room available.  The patient will be seen and further worked up in the main emergency department and their care will be completed by the main emergency department team.  I was not involved in this patient's care during the QDOC process, and unless otherwise noted in the ED provider note, was not involved in their care during their ED course.    The scribe's documentation has been prepared under my direction and personally reviewed by me in its entirety. I confirm that the note above accurately reflects all work, treatment, procedures, and medical decision making performed by me  Dr. Forte

## 2022-06-06 NOTE — ED PROVIDER NOTE - OBJECTIVE STATEMENT
88 F w/ PMHx of dementia p/w hematoma on forehead and L shoulder bruising s/p fall today striking head, believed to be a trip. 88 F w/ PMHx of dementia p/w hematoma on forehead and L shoulder bruising s/p fall today.  Pt was in the bathroom, stood up with her pants down and tripped and fell.  Daughter was outside the room and immediately fount her conscious and awake.  Pt hit head on ground but no LOC and has some right shoulder pain as well.  No nausea no vomiting.  No visual changes, neck pain, numbness or weakness.  At her baseline according to daughter that is at the bedside.

## 2022-06-06 NOTE — ED PROVIDER NOTE - NSFOLLOWUPINSTRUCTIONS_ED_ALL_ED_FT
1- Tylenol as needed for pain  2- If you have any worsening pain, headaches, visual changes, vomiting, change in mental status or any new or worsening concerns come back to the ER immediately 1- Tylenol as needed for pain  2- If you have any worsening pain, headaches, visual changes, vomiting, change in mental status or any new or worsening concerns come back to the ER immediately  3- Follow up with your doctor or our medical clinic  4- You can call [phone] to follow up her COVID result

## 2022-12-17 ENCOUNTER — INPATIENT (INPATIENT)
Facility: HOSPITAL | Age: 87
LOS: 0 days | Discharge: HOME HEALTH | End: 2022-12-18
Attending: INTERNAL MEDICINE | Admitting: INTERNAL MEDICINE
Payer: MEDICARE

## 2022-12-17 VITALS
HEART RATE: 65 BPM | OXYGEN SATURATION: 100 % | TEMPERATURE: 98 F | SYSTOLIC BLOOD PRESSURE: 144 MMHG | DIASTOLIC BLOOD PRESSURE: 80 MMHG | HEIGHT: 67 IN | WEIGHT: 119.93 LBS | RESPIRATION RATE: 17 BRPM

## 2022-12-17 DIAGNOSIS — Z90.710 ACQUIRED ABSENCE OF BOTH CERVIX AND UTERUS: Chronic | ICD-10-CM

## 2022-12-17 DIAGNOSIS — H25.9 UNSPECIFIED AGE-RELATED CATARACT: Chronic | ICD-10-CM

## 2022-12-17 DIAGNOSIS — Z98.890 OTHER SPECIFIED POSTPROCEDURAL STATES: Chronic | ICD-10-CM

## 2022-12-17 DIAGNOSIS — N17.9 ACUTE KIDNEY FAILURE, UNSPECIFIED: ICD-10-CM

## 2022-12-17 DIAGNOSIS — G45.9 TRANSIENT CEREBRAL ISCHEMIC ATTACK, UNSPECIFIED: ICD-10-CM

## 2022-12-17 DIAGNOSIS — Z98.891 HISTORY OF UTERINE SCAR FROM PREVIOUS SURGERY: Chronic | ICD-10-CM

## 2022-12-17 LAB
ALBUMIN SERPL ELPH-MCNC: 3.6 G/DL — SIGNIFICANT CHANGE UP (ref 3.3–5)
ALP SERPL-CCNC: 51 U/L — SIGNIFICANT CHANGE UP (ref 40–120)
ALT FLD-CCNC: 15 U/L — SIGNIFICANT CHANGE UP (ref 12–78)
ANION GAP SERPL CALC-SCNC: 5 MMOL/L — SIGNIFICANT CHANGE UP (ref 5–17)
APTT BLD: 26 SEC — LOW (ref 27.5–35.5)
AST SERPL-CCNC: 10 U/L — LOW (ref 15–37)
BASOPHILS # BLD AUTO: 0.06 K/UL — SIGNIFICANT CHANGE UP (ref 0–0.2)
BASOPHILS NFR BLD AUTO: 0.9 % — SIGNIFICANT CHANGE UP (ref 0–2)
BILIRUB SERPL-MCNC: 0.4 MG/DL — SIGNIFICANT CHANGE UP (ref 0.2–1.2)
BUN SERPL-MCNC: 22 MG/DL — SIGNIFICANT CHANGE UP (ref 7–23)
CALCIUM SERPL-MCNC: 9.7 MG/DL — SIGNIFICANT CHANGE UP (ref 8.5–10.1)
CHLORIDE SERPL-SCNC: 108 MMOL/L — SIGNIFICANT CHANGE UP (ref 96–108)
CHOLEST SERPL-MCNC: 208 MG/DL — HIGH
CO2 SERPL-SCNC: 30 MMOL/L — SIGNIFICANT CHANGE UP (ref 22–31)
CREAT SERPL-MCNC: 1.38 MG/DL — HIGH (ref 0.5–1.3)
EGFR: 37 ML/MIN/1.73M2 — LOW
EOSINOPHIL # BLD AUTO: 0.12 K/UL — SIGNIFICANT CHANGE UP (ref 0–0.5)
EOSINOPHIL NFR BLD AUTO: 1.9 % — SIGNIFICANT CHANGE UP (ref 0–6)
FLUAV AG NPH QL: SIGNIFICANT CHANGE UP
FLUBV AG NPH QL: SIGNIFICANT CHANGE UP
GLUCOSE BLDC GLUCOMTR-MCNC: 110 MG/DL — HIGH (ref 70–99)
GLUCOSE BLDC GLUCOMTR-MCNC: 179 MG/DL — HIGH (ref 70–99)
GLUCOSE BLDC GLUCOMTR-MCNC: 76 MG/DL — SIGNIFICANT CHANGE UP (ref 70–99)
GLUCOSE BLDC GLUCOMTR-MCNC: 88 MG/DL — SIGNIFICANT CHANGE UP (ref 70–99)
GLUCOSE SERPL-MCNC: 108 MG/DL — HIGH (ref 70–99)
HCT VFR BLD CALC: 32.5 % — LOW (ref 34.5–45)
HDLC SERPL-MCNC: 80 MG/DL — SIGNIFICANT CHANGE UP
HGB BLD-MCNC: 10.2 G/DL — LOW (ref 11.5–15.5)
IMM GRANULOCYTES NFR BLD AUTO: 0.5 % — SIGNIFICANT CHANGE UP (ref 0–0.9)
INR BLD: 0.99 RATIO — SIGNIFICANT CHANGE UP (ref 0.88–1.16)
LIPID PNL WITH DIRECT LDL SERPL: 118 MG/DL — HIGH
LYMPHOCYTES # BLD AUTO: 2.56 K/UL — SIGNIFICANT CHANGE UP (ref 1–3.3)
LYMPHOCYTES # BLD AUTO: 39.6 % — SIGNIFICANT CHANGE UP (ref 13–44)
MCHC RBC-ENTMCNC: 24.7 PG — LOW (ref 27–34)
MCHC RBC-ENTMCNC: 31.4 G/DL — LOW (ref 32–36)
MCV RBC AUTO: 78.7 FL — LOW (ref 80–100)
MONOCYTES # BLD AUTO: 0.75 K/UL — SIGNIFICANT CHANGE UP (ref 0–0.9)
MONOCYTES NFR BLD AUTO: 11.6 % — SIGNIFICANT CHANGE UP (ref 2–14)
NEUTROPHILS # BLD AUTO: 2.94 K/UL — SIGNIFICANT CHANGE UP (ref 1.8–7.4)
NEUTROPHILS NFR BLD AUTO: 45.5 % — SIGNIFICANT CHANGE UP (ref 43–77)
NON HDL CHOLESTEROL: 128 MG/DL — SIGNIFICANT CHANGE UP
NRBC # BLD: 0 /100 WBCS — SIGNIFICANT CHANGE UP (ref 0–0)
PLATELET # BLD AUTO: 187 K/UL — SIGNIFICANT CHANGE UP (ref 150–400)
POTASSIUM SERPL-MCNC: 3.5 MMOL/L — SIGNIFICANT CHANGE UP (ref 3.5–5.3)
POTASSIUM SERPL-SCNC: 3.5 MMOL/L — SIGNIFICANT CHANGE UP (ref 3.5–5.3)
PROT SERPL-MCNC: 6.7 GM/DL — SIGNIFICANT CHANGE UP (ref 6–8.3)
PROTHROM AB SERPL-ACNC: 11.8 SEC — SIGNIFICANT CHANGE UP (ref 10.5–13.4)
RBC # BLD: 4.13 M/UL — SIGNIFICANT CHANGE UP (ref 3.8–5.2)
RBC # FLD: 14.3 % — SIGNIFICANT CHANGE UP (ref 10.3–14.5)
SARS-COV-2 RNA SPEC QL NAA+PROBE: SIGNIFICANT CHANGE UP
SODIUM SERPL-SCNC: 143 MMOL/L — SIGNIFICANT CHANGE UP (ref 135–145)
TRIGL SERPL-MCNC: 48 MG/DL — SIGNIFICANT CHANGE UP
TROPONIN I, HIGH SENSITIVITY RESULT: 10.1 NG/L — SIGNIFICANT CHANGE UP
WBC # BLD: 6.46 K/UL — SIGNIFICANT CHANGE UP (ref 3.8–10.5)
WBC # FLD AUTO: 6.46 K/UL — SIGNIFICANT CHANGE UP (ref 3.8–10.5)

## 2022-12-17 PROCEDURE — 70551 MRI BRAIN STEM W/O DYE: CPT | Mod: 26

## 2022-12-17 PROCEDURE — 71045 X-RAY EXAM CHEST 1 VIEW: CPT | Mod: 26

## 2022-12-17 PROCEDURE — 99223 1ST HOSP IP/OBS HIGH 75: CPT

## 2022-12-17 PROCEDURE — 70496 CT ANGIOGRAPHY HEAD: CPT | Mod: 26,MA

## 2022-12-17 PROCEDURE — 0042T: CPT | Mod: MA

## 2022-12-17 PROCEDURE — 12345: CPT | Mod: NC

## 2022-12-17 PROCEDURE — 93010 ELECTROCARDIOGRAM REPORT: CPT

## 2022-12-17 PROCEDURE — 99285 EMERGENCY DEPT VISIT HI MDM: CPT

## 2022-12-17 PROCEDURE — 76775 US EXAM ABDO BACK WALL LIM: CPT | Mod: 26

## 2022-12-17 PROCEDURE — 70498 CT ANGIOGRAPHY NECK: CPT | Mod: 26,MA

## 2022-12-17 RX ORDER — SODIUM CHLORIDE 9 MG/ML
1000 INJECTION, SOLUTION INTRAVENOUS
Refills: 0 | Status: DISCONTINUED | OUTPATIENT
Start: 2022-12-17 | End: 2022-12-18

## 2022-12-17 RX ORDER — PANTOPRAZOLE SODIUM 20 MG/1
40 TABLET, DELAYED RELEASE ORAL
Refills: 0 | Status: DISCONTINUED | OUTPATIENT
Start: 2022-12-17 | End: 2022-12-18

## 2022-12-17 RX ORDER — GLUCAGON INJECTION, SOLUTION 0.5 MG/.1ML
1 INJECTION, SOLUTION SUBCUTANEOUS ONCE
Refills: 0 | Status: DISCONTINUED | OUTPATIENT
Start: 2022-12-17 | End: 2022-12-18

## 2022-12-17 RX ORDER — ATORVASTATIN CALCIUM 80 MG/1
80 TABLET, FILM COATED ORAL AT BEDTIME
Refills: 0 | Status: DISCONTINUED | OUTPATIENT
Start: 2022-12-17 | End: 2022-12-18

## 2022-12-17 RX ORDER — CLOPIDOGREL BISULFATE 75 MG/1
75 TABLET, FILM COATED ORAL DAILY
Refills: 0 | Status: DISCONTINUED | OUTPATIENT
Start: 2022-12-17 | End: 2022-12-18

## 2022-12-17 RX ORDER — DEXTROSE 50 % IN WATER 50 %
15 SYRINGE (ML) INTRAVENOUS ONCE
Refills: 0 | Status: DISCONTINUED | OUTPATIENT
Start: 2022-12-17 | End: 2022-12-18

## 2022-12-17 RX ORDER — DEXTROSE 50 % IN WATER 50 %
25 SYRINGE (ML) INTRAVENOUS ONCE
Refills: 0 | Status: DISCONTINUED | OUTPATIENT
Start: 2022-12-17 | End: 2022-12-18

## 2022-12-17 RX ORDER — QUETIAPINE FUMARATE 200 MG/1
25 TABLET, FILM COATED ORAL ONCE
Refills: 0 | Status: COMPLETED | OUTPATIENT
Start: 2022-12-17 | End: 2022-12-18

## 2022-12-17 RX ORDER — DEXTROSE 50 % IN WATER 50 %
12.5 SYRINGE (ML) INTRAVENOUS ONCE
Refills: 0 | Status: DISCONTINUED | OUTPATIENT
Start: 2022-12-17 | End: 2022-12-18

## 2022-12-17 RX ORDER — NIFEDIPINE 30 MG
60 TABLET, EXTENDED RELEASE 24 HR ORAL DAILY
Refills: 0 | Status: DISCONTINUED | OUTPATIENT
Start: 2022-12-17 | End: 2022-12-17

## 2022-12-17 RX ORDER — TRIAMTERENE/HYDROCHLOROTHIAZID 75 MG-50MG
1 TABLET ORAL DAILY
Refills: 0 | Status: DISCONTINUED | OUTPATIENT
Start: 2022-12-17 | End: 2022-12-17

## 2022-12-17 RX ORDER — METOPROLOL TARTRATE 50 MG
25 TABLET ORAL
Refills: 0 | Status: DISCONTINUED | OUTPATIENT
Start: 2022-12-17 | End: 2022-12-18

## 2022-12-17 RX ORDER — MULTIVIT-MIN/FERROUS GLUCONATE 9 MG/15 ML
1 LIQUID (ML) ORAL DAILY
Refills: 0 | Status: DISCONTINUED | OUTPATIENT
Start: 2022-12-17 | End: 2022-12-18

## 2022-12-17 RX ORDER — ASPIRIN/CALCIUM CARB/MAGNESIUM 324 MG
81 TABLET ORAL DAILY
Refills: 0 | Status: DISCONTINUED | OUTPATIENT
Start: 2022-12-17 | End: 2022-12-18

## 2022-12-17 RX ORDER — INSULIN LISPRO 100/ML
VIAL (ML) SUBCUTANEOUS
Refills: 0 | Status: DISCONTINUED | OUTPATIENT
Start: 2022-12-17 | End: 2022-12-18

## 2022-12-17 RX ADMIN — Medication 25 MILLIGRAM(S): at 17:38

## 2022-12-17 RX ADMIN — SODIUM CHLORIDE 75 MILLILITER(S): 9 INJECTION, SOLUTION INTRAVENOUS at 05:49

## 2022-12-17 RX ADMIN — PANTOPRAZOLE SODIUM 40 MILLIGRAM(S): 20 TABLET, DELAYED RELEASE ORAL at 05:53

## 2022-12-17 RX ADMIN — SODIUM CHLORIDE 75 MILLILITER(S): 9 INJECTION, SOLUTION INTRAVENOUS at 10:54

## 2022-12-17 RX ADMIN — Medication 81 MILLIGRAM(S): at 11:11

## 2022-12-17 RX ADMIN — Medication 0: at 07:45

## 2022-12-17 RX ADMIN — CLOPIDOGREL BISULFATE 75 MILLIGRAM(S): 75 TABLET, FILM COATED ORAL at 11:12

## 2022-12-17 RX ADMIN — SODIUM CHLORIDE 75 MILLILITER(S): 9 INJECTION, SOLUTION INTRAVENOUS at 18:15

## 2022-12-17 RX ADMIN — Medication 0: at 11:17

## 2022-12-17 RX ADMIN — Medication 1 TABLET(S): at 11:17

## 2022-12-17 RX ADMIN — Medication 25 MILLIGRAM(S): at 05:53

## 2022-12-17 NOTE — PROGRESS NOTE ADULT - SUBJECTIVE AND OBJECTIVE BOX
Patient is a 88y old  Female who presents with a chief complaint of TIA (17 Dec 2022 12:03)      OVERNIGHT EVENTS:      REVIEW OF SYSTEMS: denies chest pain/SOB, diaphoresis, no F/C, cough, dizziness, headache, blurry vision, nausea, vomiting, abdominal pain. Rest unremarkable     MEDICATIONS  (STANDING):  aspirin enteric coated 81 milliGRAM(s) Oral daily  atorvastatin 80 milliGRAM(s) Oral at bedtime  clopidogrel Tablet 75 milliGRAM(s) Oral daily  dextrose 5%. 1000 milliLiter(s) (100 mL/Hr) IV Continuous <Continuous>  dextrose 5%. 1000 milliLiter(s) (50 mL/Hr) IV Continuous <Continuous>  dextrose 50% Injectable 25 Gram(s) IV Push once  dextrose 50% Injectable 12.5 Gram(s) IV Push once  dextrose 50% Injectable 25 Gram(s) IV Push once  glucagon  Injectable 1 milliGRAM(s) IntraMuscular once  insulin lispro (ADMELOG) corrective regimen sliding scale   SubCutaneous three times a day before meals  lactated ringers. 1000 milliLiter(s) (75 mL/Hr) IV Continuous <Continuous>  metoprolol tartrate 25 milliGRAM(s) Oral two times a day  multivitamin/minerals 1 Tablet(s) Oral daily  pantoprazole    Tablet 40 milliGRAM(s) Oral before breakfast    MEDICATIONS  (PRN):  dextrose Oral Gel 15 Gram(s) Oral once PRN Blood Glucose LESS THAN 70 milliGRAM(s)/deciliter      Allergies    angiotensin converting enzyme inhibitors (Unknown)  iodine (Hives)  penicillin (Rash)  pt allergic to cataloupe (Hives)  shellfish (Hives)  Vasotec (Unknown)    Intolerances        SUBJECTIVE: in bed in NAD, no acute events overnight     T(F): 97.4 (12-17-22 @ 08:17), Max: 98.2 (12-17-22 @ 00:21)  HR: 60 (12-17-22 @ 08:17) (60 - 67)  BP: 136/56 (12-17-22 @ 08:17) (136/56 - 178/77)  RR: 18 (12-17-22 @ 08:17) (15 - 18)  SpO2: 99% (12-17-22 @ 08:17) (98% - 100%)  Wt(kg): --    PHYSICAL EXAM:  GENERAL: NAD, well-groomed, well-developed  HEAD:  Atraumatic, Normocephalic  EYES: EOMI, PERRLA, conjunctiva and sclera clear  ENMT: No tonsillar erythema, exudates, or enlargement; Moist mucous membranes, Good dentition, No lesions  NECK: Supple, No JVD, Normal thyroid  CHEST/LUNG: Clear to  auscultation bilaterally; No rales, rhonchi, wheezing, or rubs  bilaterally  HEART: Regular rate and rhythm; No murmurs, rubs, or gallops  ABDOMEN: Soft, Nontender, Nondistended; Bowel sounds present  EXTREMITIES:  2+ Peripheral Pulses, No clubbing, cyanosis, or edema BL LE  LYMPH: No lymphadenopathy noted  SKIN: No rashes or lesions  NERVOUS SYSTEM:  Alert & Oriented X3, Good concentration; Motor Strength 5/5 B/L upper and lower extremities;   DTRs 2+ intact and symmetric, sensation intact BL    LABS:                        10.2   6.46  )-----------( 187      ( 17 Dec 2022 00:39 )             32.5     12-17    143  |  108  |  22  ----------------------------<  108<H>  3.5   |  30  |  1.38<H>    Ca    9.7      17 Dec 2022 00:39    TPro  6.7  /  Alb  3.6  /  TBili  0.4  /  DBili  x   /  AST  10<L>  /  ALT  15  /  AlkPhos  51  12-17    PT/INR - ( 17 Dec 2022 00:39 )   PT: 11.8 sec;   INR: 0.99 ratio         PTT - ( 17 Dec 2022 00:39 )  PTT:26.0 sec    Cultures;   CAPILLARY BLOOD GLUCOSE      POCT Blood Glucose.: 110 mg/dL (17 Dec 2022 11:13)  POCT Blood Glucose.: 88 mg/dL (17 Dec 2022 07:39)    Lipid panel:   LDL --  TG 48          RADIOLOGY & ADDITIONAL TESTS:      Imaging Personally Reviewed:  [ x] YES      Consultant(s) Notes Reviewed:  [x ] YES     Care Discussed with [x ] Consultants [X ] Patient [x ] Family  [x ]    [x ]  Other; RN

## 2022-12-17 NOTE — ED ADULT NURSE NOTE - OBJECTIVE STATEMENT
BIBA with daughter at bedside, as per daughter pt has a hx of dementia and presents today for evaluation of slurred speech. daughter startes that he notice approximately 10min of slurred speech that have since resolved. On assessment, pt awake and oriented x2.Pt able ot verbalize name and . Face symmetrical. tongue midline. Weakness noted ot b/l lower extremities.

## 2022-12-17 NOTE — ED ADULT TRIAGE NOTE - CHIEF COMPLAINT QUOTE
Hx of HTN on plavix per daughter witness slurred speech lasted " 10 min" pt back to baseline no deficit noted. Hx of HTN on Plavix per daughter witness slurred speech lasted " 10 min" pt back to baseline no deficit noted.

## 2022-12-17 NOTE — ED PROVIDER NOTE - CLINICAL SUMMARY MEDICAL DECISION MAKING FREE TEXT BOX
88-year-old female with history of hypertension, hyperlipidemia, diabetes, previous history of TIA dementia presenting to the ED with a  episode of likely TIA, fortunately patient back to baseline any stroke scale 0 no indications for tPA or thrombectomy neuro imaging negative for large vessel occlusion or thrombus, patient to be admitted to telemetry on medicine inpatient neurology consult

## 2022-12-17 NOTE — PROGRESS NOTE ADULT - ASSESSMENT
#TIA  - Slurred speech at home  - CT/CTA head - no acute hemorrhage  - F/u MR brain  - F/u TTE  - Continue home aspirin, plavix, increase atorvastatin to 80mg  - consider  neurology in AM    #RENETTA  - Cr of 1.38, no baseline  - Hold triamterene/HCTZ  - Gentle hydration, LR@75  - Renal US f/u  - F/u urine studies    #DM  - Hold home meds  - Mod SSI  - F/u A1c, POCT    #HTN  - Continue home metoprolol  - Pt no longer on nifdepine    VTE: ICPs  Code: Full  Diet: Soft, bite sized

## 2022-12-17 NOTE — H&P ADULT - NSHPPHYSICALEXAM_GEN_ALL_CORE
T(C): 36.2 (12-17-22 @ 05:46), Max: 36.8 (12-17-22 @ 00:21)  HR: 60 (12-17-22 @ 05:46) (60 - 67)  BP: 178/77 (12-17-22 @ 05:46) (139/75 - 178/77)  RR: 17 (12-17-22 @ 05:46) (15 - 17)  SpO2: 98% (12-17-22 @ 05:46) (98% - 100%)    CONSTITUTIONAL: Well groomed, no apparent distress  EYES: PERRLA and symmetric, EOMI, No conjunctival or scleral injection, non-icteric  ENMT: Oral mucosa with moist membranes.  NECK: Supple, symmetric and without tracheal deviation   RESP: No respiratory distress, no use of accessory muscles; CTA b/l, no WRR  CV: RRR, +S1S2, no MRG; no JVD; no peripheral edema  GI: Soft, NT, ND, no rebound, no guarding  LYMPH: No cervical LAD or tenderness; no axillary LAD or tenderness; no inguinal LAD or tenderness  MSK: Normal ROM without pain, no spinal tenderness, normal muscle strength/tone  SKIN: No rashes or ulcers noted; no subcutaneous nodules or induration palpable  NEURO: CN II-XII intact; sensation intact in upper and lower extremities b/l to light touch   PSYCH: Mood and affect appropriate

## 2022-12-17 NOTE — PATIENT PROFILE ADULT - FALL HARM RISK - HARM RISK INTERVENTIONS

## 2022-12-17 NOTE — ED PROVIDER NOTE - PHYSICAL EXAMINATION
VITAL SIGNS: I have reviewed nursing notes and confirm.  CONSTITUTIONAL: well-appearing, non-toxic, NAD  SKIN: Warm dry, normal skin turgor  HEAD: NCAT  EYES: EOMI, PERRLA, no scleral icterus  ENT: Moist mucous membranes, normal pharynx   NECK: Supple; non tender. Full ROM.   CARD: RRR, no murmurs, rubs or gallops  RESP: clear to ausculation b/l.  No rales, rhonchi, or wheezing.  ABD: soft, + BS, non-tender, non-distended,  EXT: Full ROM, no bony tenderness,   NEURO: normal motor. normal sensory.  NIHSS 0   PSYCH: Cooperative, appropriate.

## 2022-12-17 NOTE — H&P ADULT - NSHPLABSRESULTS_GEN_ALL_CORE
10.2   6.46  )-----------( 187      ( 17 Dec 2022 00:39 )             32.5       12-17    143  |  108  |  22  ----------------------------<  108<H>  3.5   |  30  |  1.38<H>    Ca    9.7      17 Dec 2022 00:39    TPro  6.7  /  Alb  3.6  /  TBili  0.4  /  DBili  x   /  AST  10<L>  /  ALT  15  /  AlkPhos  51  12-17              PT/INR - ( 17 Dec 2022 00:39 )   PT: 11.8 sec;   INR: 0.99 ratio         PTT - ( 17 Dec 2022 00:39 )  PTT:26.0 sec

## 2022-12-17 NOTE — PROGRESS NOTE ADULT - ASSESSMENT
#TIA  - Slurred speech at home  - CT/CTA head - no acute hemorrhage  - F/u MR brain  - F/u TTE  - Continue home aspirin, plavix, increase atorvastatin to 80mg  - consider neurology in AM    #RENETTA  - Cr of 1.38, no baseline  - Hold triamterene/HCTZ  - Gentle hydration, LR@75  - Renal US f/u  - F/u urine studies  12/17/2022 f/u chemistry in am     #DM  - Hold home meds  - Mod SSI  - F/u A1c, POCT    #HTN  - Continue home metoprolol  - Pt no longer on nifedipine    VTE: ICPs  Code: Full  Diet: Soft, bite sized     #TIA  - Slurred speech at home  - CT/CTA head - no acute hemorrhage  - F/u MR brain  - F/u TTE  - Continue home aspirin, plavix, increase atorvastatin to 80mg  - consider neurology in AM    #RENETTA  - Cr of 1.38, no baseline  - Hold triamterene/HCTZ  - Gentle hydration, LR@75  - Renal US f/u  - F/u urine studies  12/17/2022 f/u chemistry in am     #DM  - Hold home meds  - Mod SSI  - F/u A1c, POCT    #HTN  - Continue home metoprolol  - Pt no longer on nifedipine    VTE: ICPs  Code: Full  Diet: Soft, bite sized     son at bedside feels back to baseline

## 2022-12-17 NOTE — H&P ADULT - HISTORY OF PRESENT ILLNESS
87 yo F w/ PMHx of Lewy-body dementia (w/sundowning), hypertension, hyperlipidemia, diabetes, TIA presents due to slurred speech for about 30 minutes that resolved. Slurred speech began in the afternoon and lasted about 30 min per daughter who lives with her. She had a similar episode about 1.5 years ago and no stroke was found at that time. NIH stroke scale 0 at bedside, mentally at her baseline per daughter, no other complaints. She also reports sciatic pain down her R leg. No other acute complaints. Pt denies headache, involuntary movements, weakness, tingling, chest pain, SOB, cough.    Hx obtained from patient and daughter as pt is a poor historian.

## 2022-12-17 NOTE — ED ADULT NURSE NOTE - CHIEF COMPLAINT QUOTE
Hx of HTN on Plavix per daughter witness slurred speech lasted " 10 min" pt back to baseline no deficit noted.

## 2022-12-17 NOTE — ED PROVIDER NOTE - OBJECTIVE STATEMENT
88-year-old female with history of dementia, hypertension, hyperlipidemia, diabetes, TIA presented to the ED with slurred speech for about 10 minutes that has resolved, NIH stroke scale 0, at her baseline, no other complaints.  Patient in the ED with her daughter at bedside.

## 2022-12-17 NOTE — H&P ADULT - ASSESSMENT
#TIA  - Slurred speech at home  - CT/CTA head - no acute hemorrhage  - F/u MR brain  - Continue home aspirin, plavix, increase atorvastatin to 80mg  - Consult neurology in AM    #RENETTA  - Cr of 1.38, no baseline  - Hold triamterene/HCTZ  - Gentle hydration, LR@75    #DM  - Hold home meds  - Mod SSI  - F/u A1c, POCT    #HTN  - Continue home metoprolol, nifedipine    VTE: ICPs       #TIA  - Slurred speech at home  - CT/CTA head - no acute hemorrhage  - F/u MR brain  - F/u TTE  - Continue home aspirin, plavix, increase atorvastatin to 80mg  - Consult neurology in AM    #RENETTA  - Cr of 1.38, no baseline  - Hold triamterene/HCTZ  - Gentle hydration, LR@75  - Renal US f/u  - F/u urine studies    #DM  - Hold home meds  - Mod SSI  - F/u A1c, POCT    #HTN  - Continue home metoprolol  - Pt no longer on nifdepine    VTE: ICPs     #TIA  - Slurred speech at home  - CT/CTA head - no acute hemorrhage  - F/u MR brain  - F/u TTE  - Continue home aspirin, plavix, increase atorvastatin to 80mg  - Consult neurology in AM    #RENETTA  - Cr of 1.38, no baseline  - Hold triamterene/HCTZ  - Gentle hydration, LR@75  - Renal US f/u  - F/u urine studies    #DM  - Hold home meds  - Mod SSI  - F/u A1c, POCT    #HTN  - Continue home metoprolol  - Pt no longer on nifdepine    VTE: ICPs  Code: Full  Diet: Soft, bite sized

## 2022-12-17 NOTE — PROGRESS NOTE ADULT - SUBJECTIVE AND OBJECTIVE BOX
Patient is a 88y old  Female who presents with a chief complaint of TIA (17 Dec 2022 02:50)      OVERNIGHT EVENTS:      REVIEW OF SYSTEMS: denies chest pain/SOB, diaphoresis, no F/C, cough, dizziness, headache, blurry vision, nausea, vomiting, abdominal pain. Rest unremarkable     MEDICATIONS  (STANDING):  aspirin enteric coated 81 milliGRAM(s) Oral daily  atorvastatin 80 milliGRAM(s) Oral at bedtime  clopidogrel Tablet 75 milliGRAM(s) Oral daily  dextrose 5%. 1000 milliLiter(s) (100 mL/Hr) IV Continuous <Continuous>  dextrose 5%. 1000 milliLiter(s) (50 mL/Hr) IV Continuous <Continuous>  dextrose 50% Injectable 25 Gram(s) IV Push once  dextrose 50% Injectable 12.5 Gram(s) IV Push once  dextrose 50% Injectable 25 Gram(s) IV Push once  glucagon  Injectable 1 milliGRAM(s) IntraMuscular once  insulin lispro (ADMELOG) corrective regimen sliding scale   SubCutaneous three times a day before meals  lactated ringers. 1000 milliLiter(s) (75 mL/Hr) IV Continuous <Continuous>  metoprolol tartrate 25 milliGRAM(s) Oral two times a day  multivitamin/minerals 1 Tablet(s) Oral daily  pantoprazole    Tablet 40 milliGRAM(s) Oral before breakfast    MEDICATIONS  (PRN):  dextrose Oral Gel 15 Gram(s) Oral once PRN Blood Glucose LESS THAN 70 milliGRAM(s)/deciliter      Allergies    angiotensin converting enzyme inhibitors (Unknown)  iodine (Hives)  penicillin (Rash)  pt allergic to cataloupe (Hives)  shellfish (Hives)  Vasotec (Unknown)    Intolerances        SUBJECTIVE: in bed in NAD, no acute events overnight     T(F): 97.4 (12-17-22 @ 08:17), Max: 98.2 (12-17-22 @ 00:21)  HR: 60 (12-17-22 @ 08:17) (60 - 67)  BP: 136/56 (12-17-22 @ 08:17) (136/56 - 178/77)  RR: 18 (12-17-22 @ 08:17) (15 - 18)  SpO2: 99% (12-17-22 @ 08:17) (98% - 100%)  Wt(kg): --    PHYSICAL EXAM:  GENERAL: NAD, well-groomed, well-developed  HEAD:  Atraumatic, Normocephalic  EYES: EOMI, PERRLA, conjunctiva and sclera clear  ENMT: No tonsillar erythema, exudates, or enlargement; Moist mucous membranes, Good dentition, No lesions  NECK: Supple, No JVD, Normal thyroid  CHEST/LUNG: Clear to  auscultation bilaterally; No rales, rhonchi, wheezing, or rubs  bilaterally  HEART: Regular rate and rhythm; No murmurs, rubs, or gallops  ABDOMEN: Soft, Nontender, Nondistended; Bowel sounds present  EXTREMITIES:  2+ Peripheral Pulses, No clubbing, cyanosis, or edema BL LE  SKIN: No rashes or lesions  NERVOUS SYSTEM:  Alert & Oriented X3, Good concentration; Motor Strength 5/5 B/L upper and lower extremities;   DTRs 2+ intact and symmetric, sensation intact BL    LABS:                        10.2   6.46  )-----------( 187      ( 17 Dec 2022 00:39 )             32.5     12-17    143  |  108  |  22  ----------------------------<  108<H>  3.5   |  30  |  1.38<H>    Ca    9.7      17 Dec 2022 00:39    TPro  6.7  /  Alb  3.6  /  TBili  0.4  /  DBili  x   /  AST  10<L>  /  ALT  15  /  AlkPhos  51  12-17    PT/INR - ( 17 Dec 2022 00:39 )   PT: 11.8 sec;   INR: 0.99 ratio         PTT - ( 17 Dec 2022 00:39 )  PTT:26.0 sec    Cultures;   CAPILLARY BLOOD GLUCOSE      POCT Blood Glucose.: 110 mg/dL (17 Dec 2022 11:13)  POCT Blood Glucose.: 88 mg/dL (17 Dec 2022 07:39)    Lipid panel:   LDL --  TG 48          RADIOLOGY & ADDITIONAL TESTS:      Imaging Personally Reviewed:  [ x] YES      Consultant(s) Notes Reviewed:  [x ] YES     Care Discussed with [x ] Consultants [X ] Patient [x ] Family  [x ]    [x ]  Other; RN Patient is a 88y old  Female who presents with a chief complaint of TIA (17 Dec 2022 02:50)      OVERNIGHT EVENTS:      REVIEW OF SYSTEMS: denies chest pain/SOB, diaphoresis, no F/C, cough, dizziness, headache, blurry vision, nausea, vomiting, abdominal pain. Rest unremarkable     MEDICATIONS  (STANDING):  aspirin enteric coated 81 milliGRAM(s) Oral daily  atorvastatin 80 milliGRAM(s) Oral at bedtime  clopidogrel Tablet 75 milliGRAM(s) Oral daily  dextrose 5%. 1000 milliLiter(s) (100 mL/Hr) IV Continuous <Continuous>  dextrose 5%. 1000 milliLiter(s) (50 mL/Hr) IV Continuous <Continuous>  dextrose 50% Injectable 25 Gram(s) IV Push once  dextrose 50% Injectable 12.5 Gram(s) IV Push once  dextrose 50% Injectable 25 Gram(s) IV Push once  glucagon  Injectable 1 milliGRAM(s) IntraMuscular once  insulin lispro (ADMELOG) corrective regimen sliding scale   SubCutaneous three times a day before meals  lactated ringers. 1000 milliLiter(s) (75 mL/Hr) IV Continuous <Continuous>  metoprolol tartrate 25 milliGRAM(s) Oral two times a day  multivitamin/minerals 1 Tablet(s) Oral daily  pantoprazole    Tablet 40 milliGRAM(s) Oral before breakfast    MEDICATIONS  (PRN):  dextrose Oral Gel 15 Gram(s) Oral once PRN Blood Glucose LESS THAN 70 milliGRAM(s)/deciliter      Allergies    angiotensin converting enzyme inhibitors (Unknown)  iodine (Hives)  penicillin (Rash)  pt allergic to cataloupe (Hives)  shellfish (Hives)  Vasotec (Unknown)    Intolerances        SUBJECTIVE: in bed in NAD, no acute events overnight     T(F): 97.4 (12-17-22 @ 08:17), Max: 98.2 (12-17-22 @ 00:21)  HR: 60 (12-17-22 @ 08:17) (60 - 67)  BP: 136/56 (12-17-22 @ 08:17) (136/56 - 178/77)  RR: 18 (12-17-22 @ 08:17) (15 - 18)  SpO2: 99% (12-17-22 @ 08:17) (98% - 100%)  Wt(kg): --    PHYSICAL EXAM:  GENERAL: NAD, well-groomed, well-developed, e;delerl  HEAD:  Atraumatic, Normocephalic  EYES: EOMI, PERRLA, conjunctiva and sclera clear  ENMT: No tonsillar erythema, exudates, or enlargement; Moist mucous membranes, Good dentition, No lesions  NECK: Supple,   CHEST/LUNG: Clear to  auscultation bilaterally; No rales, rhonchi, wheezing, or rubs  bilaterally  HEART: Regular rate and rhythm; No murmurs, rubs, or gallops  ABDOMEN: Soft, Nontender, Nondistended; Bowel sounds present  EXTREMITIES:  2+ Peripheral Pulses, No clubbing, cyanosis, or edema BL LE  SKIN: No rashes or lesions  NERVOUS SYSTEM:  Alert ; Motor Strength 4/5 B/L upper and lower extremities;  odilia slured speech      LABS:                        10.2   6.46  )-----------( 187      ( 17 Dec 2022 00:39 )             32.5     12-17    143  |  108  |  22  ----------------------------<  108<H>  3.5   |  30  |  1.38<H>    Ca    9.7      17 Dec 2022 00:39    TPro  6.7  /  Alb  3.6  /  TBili  0.4  /  DBili  x   /  AST  10<L>  /  ALT  15  /  AlkPhos  51  12-17    PT/INR - ( 17 Dec 2022 00:39 )   PT: 11.8 sec;   INR: 0.99 ratio         PTT - ( 17 Dec 2022 00:39 )  PTT:26.0 sec    Cultures;   CAPILLARY BLOOD GLUCOSE      POCT Blood Glucose.: 110 mg/dL (17 Dec 2022 11:13)  POCT Blood Glucose.: 88 mg/dL (17 Dec 2022 07:39)    Lipid panel:   LDL --  TG 48          RADIOLOGY & ADDITIONAL TESTS:      Imaging Personally Reviewed:  [ x] YES      Consultant(s) Notes Reviewed:  [x ] YES     Care Discussed with [x ] Consultants [X ] Patient [x ] Family  [x ]    [x ]  Other; RN

## 2022-12-18 ENCOUNTER — TRANSCRIPTION ENCOUNTER (OUTPATIENT)
Age: 87
End: 2022-12-18

## 2022-12-18 VITALS
OXYGEN SATURATION: 96 % | HEART RATE: 59 BPM | RESPIRATION RATE: 18 BRPM | TEMPERATURE: 98 F | SYSTOLIC BLOOD PRESSURE: 127 MMHG | DIASTOLIC BLOOD PRESSURE: 65 MMHG

## 2022-12-18 LAB
A1C WITH ESTIMATED AVERAGE GLUCOSE RESULT: 5.4 % — SIGNIFICANT CHANGE UP (ref 4–5.6)
ALBUMIN SERPL ELPH-MCNC: 3.2 G/DL — LOW (ref 3.3–5)
ALP SERPL-CCNC: 51 U/L — SIGNIFICANT CHANGE UP (ref 40–120)
ALT FLD-CCNC: 15 U/L — SIGNIFICANT CHANGE UP (ref 12–78)
ANION GAP SERPL CALC-SCNC: 6 MMOL/L — SIGNIFICANT CHANGE UP (ref 5–17)
AST SERPL-CCNC: 21 U/L — SIGNIFICANT CHANGE UP (ref 15–37)
BILIRUB SERPL-MCNC: 0.4 MG/DL — SIGNIFICANT CHANGE UP (ref 0.2–1.2)
BUN SERPL-MCNC: 15 MG/DL — SIGNIFICANT CHANGE UP (ref 7–23)
CALCIUM SERPL-MCNC: 9.7 MG/DL — SIGNIFICANT CHANGE UP (ref 8.5–10.1)
CHLORIDE SERPL-SCNC: 109 MMOL/L — HIGH (ref 96–108)
CHOLEST SERPL-MCNC: 198 MG/DL — SIGNIFICANT CHANGE UP
CO2 SERPL-SCNC: 27 MMOL/L — SIGNIFICANT CHANGE UP (ref 22–31)
CREAT SERPL-MCNC: 1.13 MG/DL — SIGNIFICANT CHANGE UP (ref 0.5–1.3)
EGFR: 47 ML/MIN/1.73M2 — LOW
ESTIMATED AVERAGE GLUCOSE: 108 MG/DL — SIGNIFICANT CHANGE UP (ref 68–114)
GLUCOSE BLDC GLUCOMTR-MCNC: 81 MG/DL — SIGNIFICANT CHANGE UP (ref 70–99)
GLUCOSE BLDC GLUCOMTR-MCNC: 94 MG/DL — SIGNIFICANT CHANGE UP (ref 70–99)
GLUCOSE SERPL-MCNC: 91 MG/DL — SIGNIFICANT CHANGE UP (ref 70–99)
HCT VFR BLD CALC: 32.8 % — LOW (ref 34.5–45)
HDLC SERPL-MCNC: 80 MG/DL — SIGNIFICANT CHANGE UP
HGB BLD-MCNC: 10.5 G/DL — LOW (ref 11.5–15.5)
LIPID PNL WITH DIRECT LDL SERPL: 107 MG/DL — HIGH
MAGNESIUM SERPL-MCNC: 2 MG/DL — SIGNIFICANT CHANGE UP (ref 1.6–2.6)
MCHC RBC-ENTMCNC: 25.1 PG — LOW (ref 27–34)
MCHC RBC-ENTMCNC: 32 G/DL — SIGNIFICANT CHANGE UP (ref 32–36)
MCV RBC AUTO: 78.5 FL — LOW (ref 80–100)
NON HDL CHOLESTEROL: 117 MG/DL — SIGNIFICANT CHANGE UP
NRBC # BLD: 0 /100 WBCS — SIGNIFICANT CHANGE UP (ref 0–0)
PHOSPHATE SERPL-MCNC: 3.2 MG/DL — SIGNIFICANT CHANGE UP (ref 2.5–4.5)
PLATELET # BLD AUTO: 193 K/UL — SIGNIFICANT CHANGE UP (ref 150–400)
POTASSIUM SERPL-MCNC: 4.1 MMOL/L — SIGNIFICANT CHANGE UP (ref 3.5–5.3)
POTASSIUM SERPL-SCNC: 4.1 MMOL/L — SIGNIFICANT CHANGE UP (ref 3.5–5.3)
PROT SERPL-MCNC: 6.2 GM/DL — SIGNIFICANT CHANGE UP (ref 6–8.3)
RBC # BLD: 4.18 M/UL — SIGNIFICANT CHANGE UP (ref 3.8–5.2)
RBC # FLD: 14.3 % — SIGNIFICANT CHANGE UP (ref 10.3–14.5)
SODIUM SERPL-SCNC: 142 MMOL/L — SIGNIFICANT CHANGE UP (ref 135–145)
TRIGL SERPL-MCNC: 52 MG/DL — SIGNIFICANT CHANGE UP
WBC # BLD: 6.04 K/UL — SIGNIFICANT CHANGE UP (ref 3.8–10.5)
WBC # FLD AUTO: 6.04 K/UL — SIGNIFICANT CHANGE UP (ref 3.8–10.5)

## 2022-12-18 PROCEDURE — 99239 HOSP IP/OBS DSCHRG MGMT >30: CPT

## 2022-12-18 PROCEDURE — 93306 TTE W/DOPPLER COMPLETE: CPT | Mod: 26

## 2022-12-18 RX ORDER — ATORVASTATIN CALCIUM 80 MG/1
40 TABLET, FILM COATED ORAL AT BEDTIME
Refills: 0 | Status: DISCONTINUED | OUTPATIENT
Start: 2022-12-18 | End: 2022-12-18

## 2022-12-18 RX ORDER — ASPIRIN/CALCIUM CARB/MAGNESIUM 324 MG
1 TABLET ORAL
Qty: 0 | Refills: 0 | DISCHARGE
Start: 2022-12-18

## 2022-12-18 RX ORDER — BNT162B2 0.23 MG/2.25ML
0.3 INJECTION, SUSPENSION INTRAMUSCULAR ONCE
Refills: 0 | Status: COMPLETED | OUTPATIENT
Start: 2022-12-18 | End: 2022-12-18

## 2022-12-18 RX ORDER — REPAGLINIDE 1 MG/1
1 TABLET ORAL
Qty: 0 | Refills: 0 | DISCHARGE

## 2022-12-18 RX ORDER — NIFEDIPINE 30 MG
1 TABLET, EXTENDED RELEASE 24 HR ORAL
Qty: 0 | Refills: 0 | DISCHARGE

## 2022-12-18 RX ORDER — ASPIRIN/CALCIUM CARB/MAGNESIUM 324 MG
1 TABLET ORAL
Qty: 0 | Refills: 0 | DISCHARGE

## 2022-12-18 RX ADMIN — Medication 25 MILLIGRAM(S): at 09:01

## 2022-12-18 RX ADMIN — Medication 1 TABLET(S): at 11:54

## 2022-12-18 RX ADMIN — PANTOPRAZOLE SODIUM 40 MILLIGRAM(S): 20 TABLET, DELAYED RELEASE ORAL at 09:01

## 2022-12-18 RX ADMIN — QUETIAPINE FUMARATE 25 MILLIGRAM(S): 200 TABLET, FILM COATED ORAL at 03:05

## 2022-12-18 RX ADMIN — Medication 81 MILLIGRAM(S): at 11:54

## 2022-12-18 RX ADMIN — CLOPIDOGREL BISULFATE 75 MILLIGRAM(S): 75 TABLET, FILM COATED ORAL at 11:54

## 2022-12-18 NOTE — PHYSICAL THERAPY INITIAL EVALUATION ADULT - ADDITIONAL COMMENTS
Per patient's daughter Erick at bedside: Patient lives in a house with 2 steps to enter, no rails. Prior to hospitalization, patient required 1-2 person assist to negotiate steps. Once inside, patient is situated on the 1st floor, able to ambulate short household distances. Patient has a rolling walker, ambulates household distances with supervision, able to transfer from bed to bedside commode independently during night. Patient has a HHA 2 days x 8 hours that assists with bathing, family assists as needed the rest of the time.

## 2022-12-18 NOTE — PROGRESS NOTE ADULT - ASSESSMENT
#TIA  - Slurred speech at home  - CT/CTA head - no acute hemorrhage  - F/u MR brain  - F/u TTE  - Continue home aspirin, plavix, increase atorvastatin to 80mg  - consider neurology in AM    12/18/2022 mri noted no acute cva-   f /u echo and pt eval    slurred speech resolved   lipid panel noted can decrease statin to 40mg        #RENETTA  - Cr of 1.38, no baseline  - Hold triamterene/HCTZ  - Gentle hydration, LR@75  - Renal US f/u  - F/u urine studies  12/17/2022 f/u chemistry in am   12/18/2022 f/u chemistry     #DM  - Hold home meds  - Mod SSI  - F/u A1c--> still pending     #HTN  - Continue home metoprolol  - Pt no longer on nifedipine    VTE: ICPs  Code: Full  Diet: Soft, bite sized     f/u PT evaluation     son at bedside feels back to baseline     #TIA  - Slurred speech at home  - CT/CTA head - no acute hemorrhage  - F/u MR brain  - F/u TTE  - Continue home aspirin, plavix, increase atorvastatin to 80mg  -     12/18/2022 mri noted no acute cva-   f /u echo and pt eval    slurred speech resolved   lipid panel noted can decrease statin to 40mg   NO SEDATIVE MEDICATION       #RENETTA  - Cr of 1.38, no baseline  - Hold triamterene/HCTZ  - Gentle hydration, LR@75  - Renal US f/u  - F/u urine studies  12/17/2022 f/u chemistry in am   12/18/2022  creatinine is improving     #DM  - Hold home meds  - Mod SSI  - F/u A1c--> still pending     #HTN  - Continue home metoprolol  - Pt no longer on nifedipine    VTE: ICPs  Code: Full  Diet: Soft, bite sized     f/u PT evaluation     daughters at bedside updated..  #TIA  - Slurred speech at home  - CT/CTA head - no acute hemorrhage  - F/u MR brain  - F/u TTE  - Continue home aspirin, plavix, increase atorvastatin to 80mg  -     12/18/2022 mri noted no acute cva-   f /u echo and pt eval    slurred speech resolved   lipid panel noted can decrease statin to 40mg   NO SEDATIVE MEDICATION       #RENETTA  - Cr of 1.38, no baseline  - Hold triamterene/HCTZ  - Gentle hydration, LR@75  - Renal US f/u  - F/u urine studies  12/17/2022 f/u chemistry in am   12/18/2022  creatinine is improving     #DM  - Hold home meds  - Mod SSI  - F/u A1c--> still pending     #HTN  - Continue home metoprolol  - Pt no longer on nifedipine    VTE: ICPs  Code: Full  Diet: Soft, bite sized     f/u PT evaluation     daughters at bedside updated and they wish to take her home   await PT eval note and echo

## 2022-12-18 NOTE — DISCHARGE NOTE PROVIDER - NSDCCPCAREPLAN_GEN_ALL_CORE_FT
PRINCIPAL DISCHARGE DIAGNOSIS  Diagnosis: Brain TIA  Assessment and Plan of Treatment:       SECONDARY DISCHARGE DIAGNOSES  Diagnosis: Essential hypertension  Assessment and Plan of Treatment:     Diagnosis: RENETTA (acute kidney injury)  Assessment and Plan of Treatment:     Diagnosis: Chronic diastolic congestive heart failure  Assessment and Plan of Treatment:      PRINCIPAL DISCHARGE DIAGNOSIS  Diagnosis: Brain TIA  Assessment and Plan of Treatment: continue with asa plavix      SECONDARY DISCHARGE DIAGNOSES  Diagnosis: Essential hypertension  Assessment and Plan of Treatment: continune with bp meds    Diagnosis: RENETTA (acute kidney injury)  Assessment and Plan of Treatment: resolving    Diagnosis: Chronic diastolic congestive heart failure  Assessment and Plan of Treatment: cotninue with metoprolol

## 2022-12-18 NOTE — PHYSICAL THERAPY INITIAL EVALUATION ADULT - STRENGTHENING, PT EVAL
Patient will improve strength in B LE by 1 grade in 3-4 weeks to improve overall functional mobility including gait, transfers, bed mobility and decrease risk of falls.

## 2022-12-18 NOTE — DISCHARGE NOTE NURSING/CASE MANAGEMENT/SOCIAL WORK - PATIENT PORTAL LINK FT
You can access the FollowMyHealth Patient Portal offered by [facility] by registering at the following website: [email or web address]. By joining [employer]’s FollowMyHealth portal, you will also be able to view your health information using other applications (apps) compatible with our system.

## 2022-12-18 NOTE — DISCHARGE NOTE PROVIDER - NSDCMRMEDTOKEN_GEN_ALL_CORE_FT
Antioxidant Multiple Vitamins and Minerals oral tablet: 1 tab(s) orally once a day  Aspir 81 oral delayed release tablet: 1 tab(s) orally once a day  Aspirin Enteric Coated 81 mg oral delayed release tablet: 1 tab(s) orally once a day   atorvastatin 40 mg oral tablet: 1 tab(s) orally once a day   Colace 100 mg oral capsule: 1 cap(s) orally 2 times a day  Metoprolol Tartrate 25 mg oral tablet: 1 tab(s) orally 2 times a day  NexIUM 24HR 20 mg oral delayed release capsule: 1 cap(s) orally once a day  NIFEdipine 60 mg oral tablet, extended release: 1 tab(s) orally once a day  Plavix 75 mg oral tablet: 1 tab(s) orally once a day   Prandin 1 mg oral tablet: 1 tab(s) orally 2 times a day (before meals)  triamterene-hydrochlorothiazide 37.5 mg-25 mg oral tablet: 1 tab(s) orally every other day   Antioxidant Multiple Vitamins and Minerals oral tablet: 1 tab(s) orally once a day  aspirin 81 mg oral delayed release tablet: 1 tab(s) orally once a day  atorvastatin 40 mg oral tablet: 1 tab(s) orally once a day   Colace 100 mg oral capsule: 1 cap(s) orally 2 times a day  Metoprolol Tartrate 25 mg oral tablet: 1 tab(s) orally 2 times a day  NexIUM 24HR 20 mg oral delayed release capsule: 1 cap(s) orally once a day  Plavix 75 mg oral tablet: 1 tab(s) orally once a day   triamterene-hydrochlorothiazide 37.5 mg-25 mg oral tablet: 1 tab(s) orally every other day

## 2022-12-18 NOTE — PHYSICAL THERAPY INITIAL EVALUATION ADULT - GAIT TRAINING, PT EVAL
Patient will ambulate 50 feet with rolling walker independently for household ambulation in 1-2 weeks. Patient will ascend/descend 2 steps with R rail up/L rail down with contact guard assistance in 1-2 weeks to safely navigate home environment.

## 2022-12-18 NOTE — PROGRESS NOTE ADULT - SUBJECTIVE AND OBJECTIVE BOX
Patient is a 88y old  Female who presents with a chief complaint of TIA (17 Dec 2022 02:50)      OVERNIGHT EVENTS:  none    MEDICATIONS  (STANDING):  aspirin enteric coated 81 milliGRAM(s) Oral daily  atorvastatin 80 milliGRAM(s) Oral at bedtime  clopidogrel Tablet 75 milliGRAM(s) Oral daily  dextrose 5%. 1000 milliLiter(s) (100 mL/Hr) IV Continuous <Continuous>  dextrose 5%. 1000 milliLiter(s) (50 mL/Hr) IV Continuous <Continuous>  dextrose 50% Injectable 25 Gram(s) IV Push once  dextrose 50% Injectable 12.5 Gram(s) IV Push once  dextrose 50% Injectable 25 Gram(s) IV Push once  glucagon  Injectable 1 milliGRAM(s) IntraMuscular once  insulin lispro (ADMELOG) corrective regimen sliding scale   SubCutaneous three times a day before meals  lactated ringers. 1000 milliLiter(s) (75 mL/Hr) IV Continuous <Continuous>  metoprolol tartrate 25 milliGRAM(s) Oral two times a day  multivitamin/minerals 1 Tablet(s) Oral daily  pantoprazole    Tablet 40 milliGRAM(s) Oral before breakfast    MEDICATIONS  (PRN):  dextrose Oral Gel 15 Gram(s) Oral once PRN Blood Glucose LESS THAN 70 milliGRAM(s)/deciliter      Allergies    angiotensin converting enzyme inhibitors (Unknown)  iodine (Hives)  penicillin (Rash)  pt allergic to cataloupe (Hives)  shellfish (Hives)  Vasotec (Unknown)    Intolerances        SUBJECTIVE: in bed in NAD, no acute events overnight       Vital Signs Last 24 Hrs  T(C): 36.2 (18 Dec 2022 06:10), Max: 36.6 (18 Dec 2022 00:28)  T(F): 97.1 (18 Dec 2022 06:10), Max: 97.8 (18 Dec 2022 00:28)  HR: 62 (18 Dec 2022 06:10) (55 - 67)  BP: 144/71 (18 Dec 2022 06:10) (126/64 - 152/70)  BP(mean): --  RR: 18 (18 Dec 2022 06:10) (18 - 18)  SpO2: 98% (18 Dec 2022 06:10) (97% - 99%)    Parameters below as of 18 Dec 2022 06:10  Patient On (Oxygen Delivery Method): room air      PHYSICAL EXAM:  GENERAL: NAD, well-groomed, well-developed, e;delerl  HEAD:  Atraumatic, Normocephalic  EYES: EOMI, PERRLA, conjunctiva and sclera clear  ENMT: No tonsillar erythema, exudates, or enlargement; Moist mucous membranes, Good dentition, No lesions  NECK: Supple,   CHEST/LUNG: Clear to  auscultation bilaterally; No rales, rhonchi, wheezing, or rubs  bilaterally  HEART: Regular rate and rhythm; No murmurs, rubs, or gallops  ABDOMEN: Soft, Nontender, Nondistended; Bowel sounds present  EXTREMITIES:  2+ Peripheral Pulses, No clubbing, cyanosis, or edema BL LE  SKIN: No rashes or lesions  NERVOUS SYSTEM:  Alert ; Motor Strength 4/5 B/L upper and lower extremities;  odilia slured speech      LABS:                                             10.5   6.04  )-----------( 193      ( 18 Dec 2022 05:45 )             32.8         Cultures;   CAPILLARY BLOOD GLUCOSE  CAPILLARY BLOOD GLUCOSE      POCT Blood Glucose.: 179 mg/dL (17 Dec 2022 21:00)  POCT Blood Glucose.: 76 mg/dL (17 Dec 2022 17:14)  POCT Blood Glucose.: 110 mg/dL (17 Dec 2022 11:13)    Lipid panel:   LDL --  TG 48          RADIOLOGY & ADDITIONAL TESTS:      Imaging Personally Reviewed:  [ x] YES      Consultant(s) Notes Reviewed:  [x ] YES     Care Discussed with [x ] Consultants [X ] Patient [x ] Family  [x ]    [x ]  Other; RN Patient is a 88y old  Female who presents with a chief complaint of TIA (17 Dec 2022 02:50)      OVERNIGHT EVENTS:  restless overnight and receievd seroquel  MEDICATIONS  (STANDING):  aspirin enteric coated 81 milliGRAM(s) Oral daily  atorvastatin 80 milliGRAM(s) Oral at bedtime  clopidogrel Tablet 75 milliGRAM(s) Oral daily  dextrose 5%. 1000 milliLiter(s) (100 mL/Hr) IV Continuous <Continuous>  dextrose 5%. 1000 milliLiter(s) (50 mL/Hr) IV Continuous <Continuous>  dextrose 50% Injectable 25 Gram(s) IV Push once  dextrose 50% Injectable 12.5 Gram(s) IV Push once  dextrose 50% Injectable 25 Gram(s) IV Push once  glucagon  Injectable 1 milliGRAM(s) IntraMuscular once  insulin lispro (ADMELOG) corrective regimen sliding scale   SubCutaneous three times a day before meals  lactated ringers. 1000 milliLiter(s) (75 mL/Hr) IV Continuous <Continuous>  metoprolol tartrate 25 milliGRAM(s) Oral two times a day  multivitamin/minerals 1 Tablet(s) Oral daily  pantoprazole    Tablet 40 milliGRAM(s) Oral before breakfast    MEDICATIONS  (PRN):  dextrose Oral Gel 15 Gram(s) Oral once PRN Blood Glucose LESS THAN 70 milliGRAM(s)/deciliter      Allergies    angiotensin converting enzyme inhibitors (Unknown)  iodine (Hives)  penicillin (Rash)  pt allergic to cataloupe (Hives)  shellfish (Hives)  Vasotec (Unknown)    Intolerances        SUBJECTIVE: in bed in NAD, no acute events overnight       Vital Signs Last 24 Hrs  T(C): 36.2 (18 Dec 2022 06:10), Max: 36.6 (18 Dec 2022 00:28)  T(F): 97.1 (18 Dec 2022 06:10), Max: 97.8 (18 Dec 2022 00:28)  HR: 62 (18 Dec 2022 06:10) (55 - 67)  BP: 144/71 (18 Dec 2022 06:10) (126/64 - 152/70)  BP(mean): --  RR: 18 (18 Dec 2022 06:10) (18 - 18)  SpO2: 98% (18 Dec 2022 06:10) (97% - 99%)    Parameters below as of 18 Dec 2022 06:10  Patient On (Oxygen Delivery Method): room air      PHYSICAL EXAM:  GENERAL: NAD, well-groomed, well-developed, e;delerl  HEAD:  Atraumatic, Normocephalic  EYES: EOMI, PERRLA, conjunctiva and sclera clear  ENMT: No tonsillar erythema, exudates, or enlargement; Moist mucous membranes, Good dentition, No lesions  NECK: Supple,   CHEST/LUNG: Clear to  auscultation bilaterally; No rales, rhonchi, wheezing, or rubs  bilaterally  HEART: Regular rate and rhythm; No murmurs, rubs, or gallops  ABDOMEN: Soft, Nontender, Nondistended; Bowel sounds present  EXTREMITIES:  2+ Peripheral Pulses, No clubbing, cyanosis, or edema BL LE  SKIN: No rashes or lesions  NERVOUS SYSTEM:  sleepy ; Motor Strength 4/5 B/L upper and lower extremities;  no slured speech      LABS:                                             10.5   6.04  )-----------( 193      ( 18 Dec 2022 05:45 )             32.8         Cultures;   CAPILLARY BLOOD GLUCOSE  CAPILLARY BLOOD GLUCOSE      POCT Blood Glucose.: 179 mg/dL (17 Dec 2022 21:00)  POCT Blood Glucose.: 76 mg/dL (17 Dec 2022 17:14)  POCT Blood Glucose.: 110 mg/dL (17 Dec 2022 11:13)    Lipid panel:   LDL --  TG 48          RADIOLOGY & ADDITIONAL TESTS:      Imaging Personally Reviewed:  [ x] YES      Consultant(s) Notes Reviewed:  [x ] YES     Care Discussed with [x ] Consultants [X ] Patient [x ] Family  [x ]    [x ]  Other; RN

## 2022-12-18 NOTE — DISCHARGE NOTE PROVIDER - HOSPITAL COURSE
Assessment and Plan:   · Assessment	  #TIA  - Slurred speech at home  - CT/CTA head - no acute hemorrhage  - F/u MR brain  - F/u TTE  - Continue home aspirin, plavix, increase atorvastatin to 80mg  -     12/18/2022 mri noted no acute cva-   f /u echo and pt eval    slurred speech resolved   lipid panel noted can decrease statin to 40mg   NO SEDATIVE MEDICATION  < from: TTE Echo Complete w/o Contrast w/ Doppler (12.18.22 @ 09:38) >      Summary:   1. Left ventricular ejection fraction, by visual estimation, is 60 to   65%.   2. Normal global left ventricular systolic function.   3. Spectral Doppler shows impaired relaxation pattern of left   ventricular myocardial filling (Grade I diastolic dysfunction).   4. Mild mitral annular calcification.   5. Mild mitral valve regurgitation.   6. Moderate thickening and calcification of the anterior and posterior   mitral valve leaflets.   7. Moderate tricuspid regurgitation.   8. Mild to moderate aortic regurgitation.   9. Sclerotic aortic valve with decreased opening; moderate aortic valve   stenosis with PHU 1.4cm2.  10. Estimated pulmonary artery systolic pressure is 41.2 mmHg assuming a   right atrial pressure of 5 mmHg, which is consistent with mild pulmonary   hypertension.      < end of copied text >           #RENETTA  - Cr of 1.38, no baseline  - Hold triamterene/HCTZ  - Gentle hydration, LR@75  - Renal US f/u  - F/u urine studies  12/17/2022 f/u chemistry in am   12/18/2022  creatinine is improving     #DM  - Hold home meds  - Mod SSI  - F/u A1c--> 5.4     #HTN  - Continue home metoprolol  - Pt no longer on nifedipine    VTE: ICPs  Code: Full  Diet: Soft, bite sized     f/u PT evaluation     daughters at bedside updated and they wish to take her home   await PT eval note and echo     updated dr. leong ( pcp) via phone   Assessment and Plan:   · Assessment	  #TIA  - Slurred speech at home  - CT/CTA head - no acute hemorrhage  - F/u MR brain  - F/u TTE  - Continue home aspirin, plavix, increase atorvastatin to 80mg  -     12/18/2022 mri noted no acute cva-   f /u echo and pt eval    slurred speech resolved   lipid panel noted can decrease statin to 40mg   NO SEDATIVE MEDICATION  < from: TTE Echo Complete w/o Contrast w/ Doppler (12.18.22 @ 09:38) >      Summary:   1. Left ventricular ejection fraction, by visual estimation, is 60 to   65%.   2. Normal global left ventricular systolic function.   3. Spectral Doppler shows impaired relaxation pattern of left   ventricular myocardial filling (Grade I diastolic dysfunction).   4. Mild mitral annular calcification.   5. Mild mitral valve regurgitation.   6. Moderate thickening and calcification of the anterior and posterior   mitral valve leaflets.   7. Moderate tricuspid regurgitation.   8. Mild to moderate aortic regurgitation.   9. Sclerotic aortic valve with decreased opening; moderate aortic valve   stenosis with PHU 1.4cm2.  10. Estimated pulmonary artery systolic pressure is 41.2 mmHg assuming a   right atrial pressure of 5 mmHg, which is consistent with mild pulmonary   hypertension.      < end of copied text >           #RENETTA  - Cr of 1.38, no baseline  - Hold triamterene/HCTZ  - Gentle hydration, LR@75  - Renal US f/u  - F/u urine studies  12/17/2022 f/u chemistry in am   12/18/2022  creatinine is improving     #DM  - Hold home meds  - Mod SSI  - F/u A1c--> 5.4     #HTN  - Continue home metoprolol  - Pt no longer on nifedipine    VTE: ICPs  Code: Full  Diet: Soft, bite sized     f/u PT eval c/w home pt and per family has RW and bedside commode and they declined ambulance     daughters at bedside updated and they wish to take her home      updated dr. leong ( pcp) via phone

## 2022-12-18 NOTE — DISCHARGE NOTE PROVIDER - NSDCQMAMI_CARD_ALL_CORE
[General Appearance - Alert] : alert [General Appearance - In No Acute Distress] : in no acute distress [Person] : oriented to person [Place] : oriented to place [Time] : oriented to time [Concentration Intact] : normal concentrating ability [Visual Intact] : visual attention was ~T not ~L decreased [Naming Objects] : no difficulty naming common objects [Repeating Phrases] : no difficulty repeating a phrase [Writing A Sentence] : no difficulty writing a sentence [Fluency] : fluency intact [Comprehension] : comprehension intact [Reading] : reading intact [Past History] : adequate knowledge of personal past history [Cranial Nerves Optic (II)] : visual acuity intact bilaterally,  visual fields full to confrontation, pupils equal round and reactive to light [Cranial Nerves Oculomotor (III)] : extraocular motion intact [Cranial Nerves Trigeminal (V)] : facial sensation intact symmetrically [Cranial Nerves Facial (VII)] : face symmetrical [Cranial Nerves Vestibulocochlear (VIII)] : hearing was intact bilaterally [Cranial Nerves Glossopharyngeal (IX)] : tongue and palate midline [Cranial Nerves Accessory (XI - Cranial And Spinal)] : head turning and shoulder shrug symmetric [Cranial Nerves Hypoglossal (XII)] : there was no tongue deviation with protrusion [Motor Tone] : muscle tone was normal in all four extremities No [Motor Strength] : muscle strength was normal in all four extremities [No Muscle Atrophy] : normal bulk in all four extremities [Sensation Tactile Decrease] : light touch was intact [Abnormal Walk] : normal gait [Balance] : balance was intact [Past-pointing] : there was no past-pointing [Tremor] : no tremor present [2+] : Ankle jerk left 2+ [Plantar Reflex Right Only] : normal on the right [Plantar Reflex Left Only] : normal on the left [Sclera] : the sclera and conjunctiva were normal [PERRL With Normal Accommodation] : pupils were equal in size, round, reactive to light, with normal accommodation [Extraocular Movements] : extraocular movements were intact [Outer Ear] : the ears and nose were normal in appearance [Oropharynx] : the oropharynx was normal [Neck Appearance] : the appearance of the neck was normal [Neck Cervical Mass (___cm)] : no neck mass was observed [Jugular Venous Distention Increased] : there was no jugular-venous distention [Thyroid Diffuse Enlargement] : the thyroid was not enlarged [Thyroid Nodule] : there were no palpable thyroid nodules

## 2022-12-18 NOTE — PHYSICAL THERAPY INITIAL EVALUATION ADULT - PERTINENT HX OF CURRENT PROBLEM, REHAB EVAL
Patient admitted with slurred speech x 30 minutes that has resolved. MR brain negative for acute processes.

## 2022-12-18 NOTE — DISCHARGE NOTE NURSING/CASE MANAGEMENT/SOCIAL WORK - NSDCPEFALRISK_GEN_ALL_CORE
For information on Fall & Injury Prevention, visit: [email or web address] OR  [email or web address] OR  https://www.cdc.gov/steadi/patient.html

## 2022-12-23 DIAGNOSIS — Z91.013 ALLERGY TO SEAFOOD: ICD-10-CM

## 2022-12-23 DIAGNOSIS — E78.5 HYPERLIPIDEMIA, UNSPECIFIED: ICD-10-CM

## 2022-12-23 DIAGNOSIS — R47.81 SLURRED SPEECH: ICD-10-CM

## 2022-12-23 DIAGNOSIS — Z79.82 LONG TERM (CURRENT) USE OF ASPIRIN: ICD-10-CM

## 2022-12-23 DIAGNOSIS — G31.83 NEUROCOGNITIVE DISORDER WITH LEWY BODIES: ICD-10-CM

## 2022-12-23 DIAGNOSIS — E11.9 TYPE 2 DIABETES MELLITUS WITHOUT COMPLICATIONS: ICD-10-CM

## 2022-12-23 DIAGNOSIS — Z91.018 ALLERGY TO OTHER FOODS: ICD-10-CM

## 2022-12-23 DIAGNOSIS — G45.9 TRANSIENT CEREBRAL ISCHEMIC ATTACK, UNSPECIFIED: ICD-10-CM

## 2022-12-23 DIAGNOSIS — I10 ESSENTIAL (PRIMARY) HYPERTENSION: ICD-10-CM

## 2022-12-23 DIAGNOSIS — R29.700 NIHSS SCORE 0: ICD-10-CM

## 2022-12-23 DIAGNOSIS — F02.80 DEMENTIA IN OTHER DISEASES CLASSIFIED ELSEWHERE, UNSPECIFIED SEVERITY, WITHOUT BEHAVIORAL DISTURBANCE, PSYCHOTIC DISTURBANCE, MOOD DISTURBANCE, AND ANXIETY: ICD-10-CM

## 2022-12-23 DIAGNOSIS — Z88.8 ALLERGY STATUS TO OTHER DRUGS, MEDICAMENTS AND BIOLOGICAL SUBSTANCES STATUS: ICD-10-CM

## 2022-12-23 DIAGNOSIS — Z79.02 LONG TERM (CURRENT) USE OF ANTITHROMBOTICS/ANTIPLATELETS: ICD-10-CM

## 2022-12-23 DIAGNOSIS — Z88.0 ALLERGY STATUS TO PENICILLIN: ICD-10-CM

## 2022-12-23 DIAGNOSIS — N17.9 ACUTE KIDNEY FAILURE, UNSPECIFIED: ICD-10-CM

## 2023-04-19 NOTE — ED ADULT NURSE NOTE - NEURO GAIT
requires assistance Pain Refusal Text: I offered to prescribe pain medication but the patient refused to take this medication.

## 2023-10-22 ENCOUNTER — INPATIENT (INPATIENT)
Facility: HOSPITAL | Age: 88
LOS: 2 days | Discharge: HOME | End: 2023-10-25
Attending: SURGERY | Admitting: SURGERY
Payer: MEDICARE

## 2023-10-22 VITALS
SYSTOLIC BLOOD PRESSURE: 159 MMHG | OXYGEN SATURATION: 99 % | HEART RATE: 90 BPM | TEMPERATURE: 98 F | DIASTOLIC BLOOD PRESSURE: 93 MMHG | RESPIRATION RATE: 18 BRPM

## 2023-10-22 DIAGNOSIS — Z98.891 HISTORY OF UTERINE SCAR FROM PREVIOUS SURGERY: Chronic | ICD-10-CM

## 2023-10-22 DIAGNOSIS — Z90.710 ACQUIRED ABSENCE OF BOTH CERVIX AND UTERUS: Chronic | ICD-10-CM

## 2023-10-22 DIAGNOSIS — Z98.890 OTHER SPECIFIED POSTPROCEDURAL STATES: Chronic | ICD-10-CM

## 2023-10-22 DIAGNOSIS — H25.9 UNSPECIFIED AGE-RELATED CATARACT: Chronic | ICD-10-CM

## 2023-10-22 LAB
ALBUMIN SERPL ELPH-MCNC: 4.7 G/DL — SIGNIFICANT CHANGE UP (ref 3.3–5)
ALBUMIN SERPL ELPH-MCNC: 4.7 G/DL — SIGNIFICANT CHANGE UP (ref 3.3–5)
ALP SERPL-CCNC: 66 U/L — SIGNIFICANT CHANGE UP (ref 40–120)
ALP SERPL-CCNC: 66 U/L — SIGNIFICANT CHANGE UP (ref 40–120)
ALT FLD-CCNC: 11 U/L — SIGNIFICANT CHANGE UP (ref 4–33)
ALT FLD-CCNC: 11 U/L — SIGNIFICANT CHANGE UP (ref 4–33)
ANION GAP SERPL CALC-SCNC: 18 MMOL/L — HIGH (ref 7–14)
ANION GAP SERPL CALC-SCNC: 18 MMOL/L — HIGH (ref 7–14)
APPEARANCE UR: CLEAR — SIGNIFICANT CHANGE UP
APPEARANCE UR: CLEAR — SIGNIFICANT CHANGE UP
AST SERPL-CCNC: 19 U/L — SIGNIFICANT CHANGE UP (ref 4–32)
AST SERPL-CCNC: 19 U/L — SIGNIFICANT CHANGE UP (ref 4–32)
B PERT DNA SPEC QL NAA+PROBE: SIGNIFICANT CHANGE UP
B PERT DNA SPEC QL NAA+PROBE: SIGNIFICANT CHANGE UP
B PERT+PARAPERT DNA PNL SPEC NAA+PROBE: SIGNIFICANT CHANGE UP
B PERT+PARAPERT DNA PNL SPEC NAA+PROBE: SIGNIFICANT CHANGE UP
BACTERIA # UR AUTO: ABNORMAL /HPF
BACTERIA # UR AUTO: ABNORMAL /HPF
BASOPHILS # BLD AUTO: 0.01 K/UL — SIGNIFICANT CHANGE UP (ref 0–0.2)
BASOPHILS # BLD AUTO: 0.01 K/UL — SIGNIFICANT CHANGE UP (ref 0–0.2)
BASOPHILS NFR BLD AUTO: 0.1 % — SIGNIFICANT CHANGE UP (ref 0–2)
BASOPHILS NFR BLD AUTO: 0.1 % — SIGNIFICANT CHANGE UP (ref 0–2)
BILIRUB SERPL-MCNC: 0.7 MG/DL — SIGNIFICANT CHANGE UP (ref 0.2–1.2)
BILIRUB SERPL-MCNC: 0.7 MG/DL — SIGNIFICANT CHANGE UP (ref 0.2–1.2)
BILIRUB UR-MCNC: NEGATIVE — SIGNIFICANT CHANGE UP
BILIRUB UR-MCNC: NEGATIVE — SIGNIFICANT CHANGE UP
BLD GP AB SCN SERPL QL: NEGATIVE — SIGNIFICANT CHANGE UP
BLD GP AB SCN SERPL QL: NEGATIVE — SIGNIFICANT CHANGE UP
BORDETELLA PARAPERTUSSIS (RAPRVP): SIGNIFICANT CHANGE UP
BORDETELLA PARAPERTUSSIS (RAPRVP): SIGNIFICANT CHANGE UP
BUN SERPL-MCNC: 36 MG/DL — HIGH (ref 7–23)
BUN SERPL-MCNC: 36 MG/DL — HIGH (ref 7–23)
C PNEUM DNA SPEC QL NAA+PROBE: SIGNIFICANT CHANGE UP
C PNEUM DNA SPEC QL NAA+PROBE: SIGNIFICANT CHANGE UP
CALCIUM SERPL-MCNC: 11.5 MG/DL — HIGH (ref 8.4–10.5)
CALCIUM SERPL-MCNC: 11.5 MG/DL — HIGH (ref 8.4–10.5)
CAST: 6 /LPF — HIGH (ref 0–4)
CAST: 6 /LPF — HIGH (ref 0–4)
CHLORIDE SERPL-SCNC: 97 MMOL/L — LOW (ref 98–107)
CHLORIDE SERPL-SCNC: 97 MMOL/L — LOW (ref 98–107)
CO2 SERPL-SCNC: 26 MMOL/L — SIGNIFICANT CHANGE UP (ref 22–31)
CO2 SERPL-SCNC: 26 MMOL/L — SIGNIFICANT CHANGE UP (ref 22–31)
COLOR SPEC: YELLOW — SIGNIFICANT CHANGE UP
COLOR SPEC: YELLOW — SIGNIFICANT CHANGE UP
CREAT SERPL-MCNC: 1.21 MG/DL — SIGNIFICANT CHANGE UP (ref 0.5–1.3)
CREAT SERPL-MCNC: 1.21 MG/DL — SIGNIFICANT CHANGE UP (ref 0.5–1.3)
DIFF PNL FLD: NEGATIVE — SIGNIFICANT CHANGE UP
DIFF PNL FLD: NEGATIVE — SIGNIFICANT CHANGE UP
EGFR: 43 ML/MIN/1.73M2 — LOW
EGFR: 43 ML/MIN/1.73M2 — LOW
EOSINOPHIL # BLD AUTO: 0 K/UL — SIGNIFICANT CHANGE UP (ref 0–0.5)
EOSINOPHIL # BLD AUTO: 0 K/UL — SIGNIFICANT CHANGE UP (ref 0–0.5)
EOSINOPHIL NFR BLD AUTO: 0 % — SIGNIFICANT CHANGE UP (ref 0–6)
EOSINOPHIL NFR BLD AUTO: 0 % — SIGNIFICANT CHANGE UP (ref 0–6)
FLUAV SUBTYP SPEC NAA+PROBE: SIGNIFICANT CHANGE UP
FLUAV SUBTYP SPEC NAA+PROBE: SIGNIFICANT CHANGE UP
FLUBV RNA SPEC QL NAA+PROBE: SIGNIFICANT CHANGE UP
FLUBV RNA SPEC QL NAA+PROBE: SIGNIFICANT CHANGE UP
GLUCOSE SERPL-MCNC: 202 MG/DL — HIGH (ref 70–99)
GLUCOSE SERPL-MCNC: 202 MG/DL — HIGH (ref 70–99)
GLUCOSE UR QL: NEGATIVE MG/DL — SIGNIFICANT CHANGE UP
GLUCOSE UR QL: NEGATIVE MG/DL — SIGNIFICANT CHANGE UP
HADV DNA SPEC QL NAA+PROBE: SIGNIFICANT CHANGE UP
HADV DNA SPEC QL NAA+PROBE: SIGNIFICANT CHANGE UP
HCOV 229E RNA SPEC QL NAA+PROBE: SIGNIFICANT CHANGE UP
HCOV 229E RNA SPEC QL NAA+PROBE: SIGNIFICANT CHANGE UP
HCOV HKU1 RNA SPEC QL NAA+PROBE: SIGNIFICANT CHANGE UP
HCOV HKU1 RNA SPEC QL NAA+PROBE: SIGNIFICANT CHANGE UP
HCOV NL63 RNA SPEC QL NAA+PROBE: SIGNIFICANT CHANGE UP
HCOV NL63 RNA SPEC QL NAA+PROBE: SIGNIFICANT CHANGE UP
HCOV OC43 RNA SPEC QL NAA+PROBE: SIGNIFICANT CHANGE UP
HCOV OC43 RNA SPEC QL NAA+PROBE: SIGNIFICANT CHANGE UP
HCT VFR BLD CALC: 40.2 % — SIGNIFICANT CHANGE UP (ref 34.5–45)
HCT VFR BLD CALC: 40.2 % — SIGNIFICANT CHANGE UP (ref 34.5–45)
HGB BLD-MCNC: 13 G/DL — SIGNIFICANT CHANGE UP (ref 11.5–15.5)
HGB BLD-MCNC: 13 G/DL — SIGNIFICANT CHANGE UP (ref 11.5–15.5)
HMPV RNA SPEC QL NAA+PROBE: SIGNIFICANT CHANGE UP
HMPV RNA SPEC QL NAA+PROBE: SIGNIFICANT CHANGE UP
HPIV1 RNA SPEC QL NAA+PROBE: SIGNIFICANT CHANGE UP
HPIV1 RNA SPEC QL NAA+PROBE: SIGNIFICANT CHANGE UP
HPIV2 RNA SPEC QL NAA+PROBE: SIGNIFICANT CHANGE UP
HPIV2 RNA SPEC QL NAA+PROBE: SIGNIFICANT CHANGE UP
HPIV3 RNA SPEC QL NAA+PROBE: SIGNIFICANT CHANGE UP
HPIV3 RNA SPEC QL NAA+PROBE: SIGNIFICANT CHANGE UP
HPIV4 RNA SPEC QL NAA+PROBE: SIGNIFICANT CHANGE UP
HPIV4 RNA SPEC QL NAA+PROBE: SIGNIFICANT CHANGE UP
IANC: 9.13 K/UL — HIGH (ref 1.8–7.4)
IANC: 9.13 K/UL — HIGH (ref 1.8–7.4)
IMM GRANULOCYTES NFR BLD AUTO: 0.5 % — SIGNIFICANT CHANGE UP (ref 0–0.9)
IMM GRANULOCYTES NFR BLD AUTO: 0.5 % — SIGNIFICANT CHANGE UP (ref 0–0.9)
KETONES UR-MCNC: NEGATIVE MG/DL — SIGNIFICANT CHANGE UP
KETONES UR-MCNC: NEGATIVE MG/DL — SIGNIFICANT CHANGE UP
LEUKOCYTE ESTERASE UR-ACNC: NEGATIVE — SIGNIFICANT CHANGE UP
LEUKOCYTE ESTERASE UR-ACNC: NEGATIVE — SIGNIFICANT CHANGE UP
LIDOCAIN IGE QN: 44 U/L — SIGNIFICANT CHANGE UP (ref 7–60)
LIDOCAIN IGE QN: 44 U/L — SIGNIFICANT CHANGE UP (ref 7–60)
LYMPHOCYTES # BLD AUTO: 0.74 K/UL — LOW (ref 1–3.3)
LYMPHOCYTES # BLD AUTO: 0.74 K/UL — LOW (ref 1–3.3)
LYMPHOCYTES # BLD AUTO: 7 % — LOW (ref 13–44)
LYMPHOCYTES # BLD AUTO: 7 % — LOW (ref 13–44)
M PNEUMO DNA SPEC QL NAA+PROBE: SIGNIFICANT CHANGE UP
M PNEUMO DNA SPEC QL NAA+PROBE: SIGNIFICANT CHANGE UP
MCHC RBC-ENTMCNC: 24.8 PG — LOW (ref 27–34)
MCHC RBC-ENTMCNC: 24.8 PG — LOW (ref 27–34)
MCHC RBC-ENTMCNC: 32.3 GM/DL — SIGNIFICANT CHANGE UP (ref 32–36)
MCHC RBC-ENTMCNC: 32.3 GM/DL — SIGNIFICANT CHANGE UP (ref 32–36)
MCV RBC AUTO: 76.6 FL — LOW (ref 80–100)
MCV RBC AUTO: 76.6 FL — LOW (ref 80–100)
MONOCYTES # BLD AUTO: 0.7 K/UL — SIGNIFICANT CHANGE UP (ref 0–0.9)
MONOCYTES # BLD AUTO: 0.7 K/UL — SIGNIFICANT CHANGE UP (ref 0–0.9)
MONOCYTES NFR BLD AUTO: 6.6 % — SIGNIFICANT CHANGE UP (ref 2–14)
MONOCYTES NFR BLD AUTO: 6.6 % — SIGNIFICANT CHANGE UP (ref 2–14)
NEUTROPHILS # BLD AUTO: 9.13 K/UL — HIGH (ref 1.8–7.4)
NEUTROPHILS # BLD AUTO: 9.13 K/UL — HIGH (ref 1.8–7.4)
NEUTROPHILS NFR BLD AUTO: 85.8 % — HIGH (ref 43–77)
NEUTROPHILS NFR BLD AUTO: 85.8 % — HIGH (ref 43–77)
NITRITE UR-MCNC: NEGATIVE — SIGNIFICANT CHANGE UP
NITRITE UR-MCNC: NEGATIVE — SIGNIFICANT CHANGE UP
NRBC # BLD: 0 /100 WBCS — SIGNIFICANT CHANGE UP (ref 0–0)
NRBC # BLD: 0 /100 WBCS — SIGNIFICANT CHANGE UP (ref 0–0)
NRBC # FLD: 0 K/UL — SIGNIFICANT CHANGE UP (ref 0–0)
NRBC # FLD: 0 K/UL — SIGNIFICANT CHANGE UP (ref 0–0)
PH UR: 7 — SIGNIFICANT CHANGE UP (ref 5–8)
PH UR: 7 — SIGNIFICANT CHANGE UP (ref 5–8)
PLATELET # BLD AUTO: 229 K/UL — SIGNIFICANT CHANGE UP (ref 150–400)
PLATELET # BLD AUTO: 229 K/UL — SIGNIFICANT CHANGE UP (ref 150–400)
POTASSIUM SERPL-MCNC: 4.6 MMOL/L — SIGNIFICANT CHANGE UP (ref 3.5–5.3)
POTASSIUM SERPL-MCNC: 4.6 MMOL/L — SIGNIFICANT CHANGE UP (ref 3.5–5.3)
POTASSIUM SERPL-SCNC: 4.6 MMOL/L — SIGNIFICANT CHANGE UP (ref 3.5–5.3)
POTASSIUM SERPL-SCNC: 4.6 MMOL/L — SIGNIFICANT CHANGE UP (ref 3.5–5.3)
PROT SERPL-MCNC: 7.7 G/DL — SIGNIFICANT CHANGE UP (ref 6–8.3)
PROT SERPL-MCNC: 7.7 G/DL — SIGNIFICANT CHANGE UP (ref 6–8.3)
PROT UR-MCNC: 30 MG/DL
PROT UR-MCNC: 30 MG/DL
RAPID RVP RESULT: SIGNIFICANT CHANGE UP
RAPID RVP RESULT: SIGNIFICANT CHANGE UP
RBC # BLD: 5.25 M/UL — HIGH (ref 3.8–5.2)
RBC # BLD: 5.25 M/UL — HIGH (ref 3.8–5.2)
RBC # FLD: 14.1 % — SIGNIFICANT CHANGE UP (ref 10.3–14.5)
RBC # FLD: 14.1 % — SIGNIFICANT CHANGE UP (ref 10.3–14.5)
RBC CASTS # UR COMP ASSIST: 1 /HPF — SIGNIFICANT CHANGE UP (ref 0–4)
RBC CASTS # UR COMP ASSIST: 1 /HPF — SIGNIFICANT CHANGE UP (ref 0–4)
REVIEW: SIGNIFICANT CHANGE UP
REVIEW: SIGNIFICANT CHANGE UP
RH IG SCN BLD-IMP: POSITIVE — SIGNIFICANT CHANGE UP
RH IG SCN BLD-IMP: POSITIVE — SIGNIFICANT CHANGE UP
RSV RNA SPEC QL NAA+PROBE: SIGNIFICANT CHANGE UP
RSV RNA SPEC QL NAA+PROBE: SIGNIFICANT CHANGE UP
RV+EV RNA SPEC QL NAA+PROBE: SIGNIFICANT CHANGE UP
RV+EV RNA SPEC QL NAA+PROBE: SIGNIFICANT CHANGE UP
SARS-COV-2 RNA SPEC QL NAA+PROBE: SIGNIFICANT CHANGE UP
SARS-COV-2 RNA SPEC QL NAA+PROBE: SIGNIFICANT CHANGE UP
SODIUM SERPL-SCNC: 141 MMOL/L — SIGNIFICANT CHANGE UP (ref 135–145)
SODIUM SERPL-SCNC: 141 MMOL/L — SIGNIFICANT CHANGE UP (ref 135–145)
SP GR SPEC: 1.01 — SIGNIFICANT CHANGE UP (ref 1–1.03)
SP GR SPEC: 1.01 — SIGNIFICANT CHANGE UP (ref 1–1.03)
SQUAMOUS # UR AUTO: 11 /HPF — HIGH (ref 0–5)
SQUAMOUS # UR AUTO: 11 /HPF — HIGH (ref 0–5)
UROBILINOGEN FLD QL: 1 MG/DL — SIGNIFICANT CHANGE UP (ref 0.2–1)
UROBILINOGEN FLD QL: 1 MG/DL — SIGNIFICANT CHANGE UP (ref 0.2–1)
WBC # BLD: 10.63 K/UL — HIGH (ref 3.8–10.5)
WBC # BLD: 10.63 K/UL — HIGH (ref 3.8–10.5)
WBC # FLD AUTO: 10.63 K/UL — HIGH (ref 3.8–10.5)
WBC # FLD AUTO: 10.63 K/UL — HIGH (ref 3.8–10.5)
WBC UR QL: 3 /HPF — SIGNIFICANT CHANGE UP (ref 0–5)
WBC UR QL: 3 /HPF — SIGNIFICANT CHANGE UP (ref 0–5)

## 2023-10-22 PROCEDURE — 71046 X-RAY EXAM CHEST 2 VIEWS: CPT | Mod: 26

## 2023-10-22 PROCEDURE — 74176 CT ABD & PELVIS W/O CONTRAST: CPT | Mod: 26,MA

## 2023-10-22 PROCEDURE — 99285 EMERGENCY DEPT VISIT HI MDM: CPT

## 2023-10-22 RX ORDER — SODIUM CHLORIDE 9 MG/ML
1000 INJECTION INTRAMUSCULAR; INTRAVENOUS; SUBCUTANEOUS ONCE
Refills: 0 | Status: COMPLETED | OUTPATIENT
Start: 2023-10-22 | End: 2023-10-22

## 2023-10-22 RX ORDER — FAMOTIDINE 10 MG/ML
20 INJECTION INTRAVENOUS ONCE
Refills: 0 | Status: COMPLETED | OUTPATIENT
Start: 2023-10-22 | End: 2023-10-22

## 2023-10-22 RX ADMIN — FAMOTIDINE 20 MILLIGRAM(S): 10 INJECTION INTRAVENOUS at 18:47

## 2023-10-22 RX ADMIN — SODIUM CHLORIDE 1000 MILLILITER(S): 9 INJECTION INTRAMUSCULAR; INTRAVENOUS; SUBCUTANEOUS at 17:28

## 2023-10-22 RX ADMIN — SODIUM CHLORIDE 1000 MILLILITER(S): 9 INJECTION INTRAMUSCULAR; INTRAVENOUS; SUBCUTANEOUS at 15:06

## 2023-10-22 NOTE — ED ADULT NURSE REASSESSMENT NOTE - NS ED NURSE REASSESS COMMENT FT1
Pt currently resting comfortably in bed with family members at bedside. Awaiting CT results. NAD at this time. Care provided. Safety maintained. NAD at this time.

## 2023-10-22 NOTE — ED PROVIDER NOTE - CLINICAL SUMMARY MEDICAL DECISION MAKING FREE TEXT BOX
89yoF PMH HTN, mild dementia, p/w abd pain, n/v since last night. bib daughter after patient was more lethargic today prompting ER eval. Last had tea last night. no abd surgeries in the past. Pt alert and oriented x 3, denies any abd pain at this time. denies chest pain, shortness of breath, fevers, diarrhea, numbness, tingling. lives with daughter, ambulates with walker at baseline.     pt nontoxic appearing, abd soft ntnd  will check labs, urine, CXR, to r/o UTI vs PNA, and give fluids for dehydration  reassess abd exam, and PO challenge and if tolerates and labs wnl will dc 89yoF PMH HTN, mild dementia, p/w abd pain, n/v since last night. bib daughter after patient was more lethargic today prompting ER eval. Last had tea last night. no abd surgeries in the past. Pt alert and oriented x 3, denies any abd pain at this time. denies chest pain, shortness of breath, fevers, diarrhea, numbness, tingling. lives with daughter, ambulates with walker at baseline.     pt nontoxic appearing, abd soft ntnd  will check labs, urine, CXR, to r/o UTI vs PNA, and give fluids for dehydration  reassess abd exam, and PO challenge and if tolerates and labs wnl will dc    Nereida Ellis MD attending physician this is an 89-year-old woman who has been less responsive at home than usual.  She has history of mild dementia hypertension and she had abdominal pain with nausea and vomiting since last night patient with a history of arthritis cardiomyopathy carpal tunnel syndrome diverticulitis diabetes type 2 glaucoma gout hernia hypertension high lipids Lewy body dementia.    Surgeries include hysterectomy lumpectomy parathyroidectomy and .    Patient with blood pressure of 173/82 otherwise vital signs are reasonable.  Patient is examined in the presence of her daughter.  Patient is pleasant and interactive but clearly has some mild dementia.  She says her belly did hurt before but now it does not.  On exam her abdomen is soft no rebound no guarding.  Lungs are clear cor regular rate rhythm S1-S2 no murmurs.    I would send baseline labs.  Given that abdomen is clearly not tender at this time I do not think she merits CAT scan.  Patient is coherent enough to be able to tell me if it hurts.  We will also check urine.    Addendum*patient with urine that is clear but oddly is mildly hypercalcemic.  Patient does have dry mucous membranes so we will hydrate her and evaluate her.

## 2023-10-22 NOTE — ED ADULT NURSE NOTE - OBJECTIVE STATEMENT
Daughter at bedside. Patient came in brought in by daughter with the complaints of Abdominal pain with Nausea, vomiting and no appetite from last 24 hours. Patient is Alert and oriented x 2 -3 with periods of forgetfulness. No other complaints. Patient denies abdominal pain at this time. DEEPAK Grant at bedside. Awaiting further orders. Nursing care continues

## 2023-10-22 NOTE — ED PROVIDER NOTE - ATTENDING APP SHARED VISIT CONTRIBUTION OF CARE
Nereida Ellis MD attending physician this is an 89-year-old woman who has been less responsive at home than usual.  She has history of mild dementia hypertension and she had abdominal pain with nausea and vomiting since last night patient with a history of arthritis cardiomyopathy carpal tunnel syndrome diverticulitis diabetes type 2 glaucoma gout hernia hypertension high lipids Lewy body dementia.    Surgeries include hysterectomy lumpectomy parathyroidectomy and .    Patient with blood pressure of 173/82 otherwise vital signs are reasonable.  Patient is examined in the presence of her daughter.  Patient is pleasant and interactive but clearly has some mild dementia.  She says her belly did hurt before but now it does not.  On exam her abdomen is soft no rebound no guarding.  Lungs are clear cor regular rate rhythm S1-S2 no murmurs.    I would send baseline labs.  Given that abdomen is clearly not tender at this time I do not think she merits CAT scan.  Patient is coherent enough to be able to tell me if it hurts.  We will also check urine.    Addendum*patient with urine that is clear but oddly is mildly hypercalcemic.  Patient does have dry mucous membranes so we will hydrate her and evaluate her.    I performed a history and physical exam of the patient and discussed their management with the resident and /or advanced care provider. I reviewed the resident and /or ACP's note and agree with the documented findings and plan of care. My medical decison making and observations are found above.

## 2023-10-22 NOTE — ED ADULT NURSE REASSESSMENT NOTE - NS ED NURSE REASSESS COMMENT FT1
Break relief RN: A&Ox1 (self) awake and alert, accompanied by family members denies complaints at this time. No acute distress noted. RR equal and unlabored. NSR noted on cardiac monitor. Urine labs drawn and sent.  Care plan continued. Comfort measures provided. Safety maintained.

## 2023-10-22 NOTE — ED PROVIDER NOTE - PHYSICAL EXAMINATION
CONSTITUTIONAL: Well-appearing; well-nourished; in no apparent distress;  HEAD: Normocephalic, atraumatic;  EYES: conjunctiva and sclera WNL; dry mucous membranes  CARD: Normal S1, S2; no murmurs, rubs, or gallops noted  RESP: Normal chest excursion with respiration; breath sounds clear and equal bilaterally; no wheezes, rhonchi, or rales noted  ABD/GI: soft, non-distended; non-tender; no palpable organomegaly, no pulsatile mass  EXT/MS: moves all extremities  SKIN: Normal for age and race; warm; dry; good turgor; no apparent lesions or exudate noted  NEURO: Awake, alert, oriented x 3, no gross deficits  PSYCH: Normal mood; appropriate affect

## 2023-10-22 NOTE — ED ADULT NURSE NOTE - NSFALLHARMRISKINTERV_ED_ALL_ED

## 2023-10-22 NOTE — ED PROVIDER NOTE - PROGRESS NOTE DETAILS
pt po challenged, with reflux and upper abd discomfort. will order CT a/p to r/o obstruction. DEEPAK Pascal: Pt received at sign out pending CT A/p; pt resting comfortably in no distress; sent for CT, awaiting results. DEEPAK Pascal: CT reviewed concern for early SBO; case d/w surgery who will be in to see patient; this was discussed with family who verbalized understanding, awaiting surgery eval.

## 2023-10-22 NOTE — ED ADULT TRIAGE NOTE - CHIEF COMPLAINT QUOTE
c/o lower abd pain with episodes of N/V lack of appetite onset 24 hours PTA. phx DM type 2, Dementia, HTN. On Plavix for unknown etiology.

## 2023-10-22 NOTE — ED PROVIDER NOTE - OBJECTIVE STATEMENT
89yoF PMH HTN, mild dementia, p/w abd pain, n/v since last night. bib daughter after patient was more lethargic today prompting ER eval. Last had tea last night. no abd surgeries in the past. Pt alert and oriented x 3, denies any abd pain at this time. denies chest pain, shortness of breath, fevers, diarrhea, numbness, tingling. lives with daughter, ambulates with walker at baseline.

## 2023-10-23 DIAGNOSIS — K56.609 UNSPECIFIED INTESTINAL OBSTRUCTION, UNSPECIFIED AS TO PARTIAL VERSUS COMPLETE OBSTRUCTION: ICD-10-CM

## 2023-10-23 LAB
ANION GAP SERPL CALC-SCNC: 11 MMOL/L — SIGNIFICANT CHANGE UP (ref 7–14)
ANION GAP SERPL CALC-SCNC: 11 MMOL/L — SIGNIFICANT CHANGE UP (ref 7–14)
BUN SERPL-MCNC: 28 MG/DL — HIGH (ref 7–23)
BUN SERPL-MCNC: 28 MG/DL — HIGH (ref 7–23)
CALCIUM SERPL-MCNC: 9.5 MG/DL — SIGNIFICANT CHANGE UP (ref 8.4–10.5)
CALCIUM SERPL-MCNC: 9.5 MG/DL — SIGNIFICANT CHANGE UP (ref 8.4–10.5)
CHLORIDE SERPL-SCNC: 106 MMOL/L — SIGNIFICANT CHANGE UP (ref 98–107)
CHLORIDE SERPL-SCNC: 106 MMOL/L — SIGNIFICANT CHANGE UP (ref 98–107)
CO2 SERPL-SCNC: 24 MMOL/L — SIGNIFICANT CHANGE UP (ref 22–31)
CO2 SERPL-SCNC: 24 MMOL/L — SIGNIFICANT CHANGE UP (ref 22–31)
CREAT SERPL-MCNC: 1.19 MG/DL — SIGNIFICANT CHANGE UP (ref 0.5–1.3)
CREAT SERPL-MCNC: 1.19 MG/DL — SIGNIFICANT CHANGE UP (ref 0.5–1.3)
EGFR: 44 ML/MIN/1.73M2 — LOW
EGFR: 44 ML/MIN/1.73M2 — LOW
GLUCOSE BLDC GLUCOMTR-MCNC: 114 MG/DL — HIGH (ref 70–99)
GLUCOSE BLDC GLUCOMTR-MCNC: 114 MG/DL — HIGH (ref 70–99)
GLUCOSE BLDC GLUCOMTR-MCNC: 121 MG/DL — HIGH (ref 70–99)
GLUCOSE BLDC GLUCOMTR-MCNC: 121 MG/DL — HIGH (ref 70–99)
GLUCOSE BLDC GLUCOMTR-MCNC: 125 MG/DL — HIGH (ref 70–99)
GLUCOSE BLDC GLUCOMTR-MCNC: 125 MG/DL — HIGH (ref 70–99)
GLUCOSE BLDC GLUCOMTR-MCNC: 94 MG/DL — SIGNIFICANT CHANGE UP (ref 70–99)
GLUCOSE BLDC GLUCOMTR-MCNC: 94 MG/DL — SIGNIFICANT CHANGE UP (ref 70–99)
GLUCOSE BLDC GLUCOMTR-MCNC: 95 MG/DL — SIGNIFICANT CHANGE UP (ref 70–99)
GLUCOSE BLDC GLUCOMTR-MCNC: 95 MG/DL — SIGNIFICANT CHANGE UP (ref 70–99)
GLUCOSE SERPL-MCNC: 124 MG/DL — HIGH (ref 70–99)
GLUCOSE SERPL-MCNC: 124 MG/DL — HIGH (ref 70–99)
HCT VFR BLD CALC: 30.1 % — LOW (ref 34.5–45)
HCT VFR BLD CALC: 30.1 % — LOW (ref 34.5–45)
HGB BLD-MCNC: 10.2 G/DL — LOW (ref 11.5–15.5)
HGB BLD-MCNC: 10.2 G/DL — LOW (ref 11.5–15.5)
MAGNESIUM SERPL-MCNC: 2 MG/DL — SIGNIFICANT CHANGE UP (ref 1.6–2.6)
MAGNESIUM SERPL-MCNC: 2 MG/DL — SIGNIFICANT CHANGE UP (ref 1.6–2.6)
MCHC RBC-ENTMCNC: 25.2 PG — LOW (ref 27–34)
MCHC RBC-ENTMCNC: 25.2 PG — LOW (ref 27–34)
MCHC RBC-ENTMCNC: 33.9 GM/DL — SIGNIFICANT CHANGE UP (ref 32–36)
MCHC RBC-ENTMCNC: 33.9 GM/DL — SIGNIFICANT CHANGE UP (ref 32–36)
MCV RBC AUTO: 74.5 FL — LOW (ref 80–100)
MCV RBC AUTO: 74.5 FL — LOW (ref 80–100)
NRBC # BLD: 0 /100 WBCS — SIGNIFICANT CHANGE UP (ref 0–0)
NRBC # BLD: 0 /100 WBCS — SIGNIFICANT CHANGE UP (ref 0–0)
NRBC # FLD: 0 K/UL — SIGNIFICANT CHANGE UP (ref 0–0)
NRBC # FLD: 0 K/UL — SIGNIFICANT CHANGE UP (ref 0–0)
PHOSPHATE SERPL-MCNC: 2.6 MG/DL — SIGNIFICANT CHANGE UP (ref 2.5–4.5)
PHOSPHATE SERPL-MCNC: 2.6 MG/DL — SIGNIFICANT CHANGE UP (ref 2.5–4.5)
PLATELET # BLD AUTO: 174 K/UL — SIGNIFICANT CHANGE UP (ref 150–400)
PLATELET # BLD AUTO: 174 K/UL — SIGNIFICANT CHANGE UP (ref 150–400)
POTASSIUM SERPL-MCNC: 3.6 MMOL/L — SIGNIFICANT CHANGE UP (ref 3.5–5.3)
POTASSIUM SERPL-MCNC: 3.6 MMOL/L — SIGNIFICANT CHANGE UP (ref 3.5–5.3)
POTASSIUM SERPL-SCNC: 3.6 MMOL/L — SIGNIFICANT CHANGE UP (ref 3.5–5.3)
POTASSIUM SERPL-SCNC: 3.6 MMOL/L — SIGNIFICANT CHANGE UP (ref 3.5–5.3)
RBC # BLD: 4.04 M/UL — SIGNIFICANT CHANGE UP (ref 3.8–5.2)
RBC # BLD: 4.04 M/UL — SIGNIFICANT CHANGE UP (ref 3.8–5.2)
RBC # FLD: 14.2 % — SIGNIFICANT CHANGE UP (ref 10.3–14.5)
RBC # FLD: 14.2 % — SIGNIFICANT CHANGE UP (ref 10.3–14.5)
SODIUM SERPL-SCNC: 141 MMOL/L — SIGNIFICANT CHANGE UP (ref 135–145)
SODIUM SERPL-SCNC: 141 MMOL/L — SIGNIFICANT CHANGE UP (ref 135–145)
WBC # BLD: 8.57 K/UL — SIGNIFICANT CHANGE UP (ref 3.8–10.5)
WBC # BLD: 8.57 K/UL — SIGNIFICANT CHANGE UP (ref 3.8–10.5)
WBC # FLD AUTO: 8.57 K/UL — SIGNIFICANT CHANGE UP (ref 3.8–10.5)
WBC # FLD AUTO: 8.57 K/UL — SIGNIFICANT CHANGE UP (ref 3.8–10.5)

## 2023-10-23 PROCEDURE — 99222 1ST HOSP IP/OBS MODERATE 55: CPT | Mod: GC

## 2023-10-23 RX ORDER — POTASSIUM CHLORIDE 20 MEQ
40 PACKET (EA) ORAL ONCE
Refills: 0 | Status: DISCONTINUED | OUTPATIENT
Start: 2023-10-23 | End: 2023-10-23

## 2023-10-23 RX ORDER — DOCUSATE SODIUM 100 MG
2 CAPSULE ORAL
Refills: 0 | DISCHARGE

## 2023-10-23 RX ORDER — DOCUSATE SODIUM 100 MG
1 CAPSULE ORAL
Qty: 0 | Refills: 0 | DISCHARGE

## 2023-10-23 RX ORDER — MULTIVIT-MIN/FERROUS GLUCONATE 9 MG/15 ML
1 LIQUID (ML) ORAL
Qty: 0 | Refills: 0 | DISCHARGE

## 2023-10-23 RX ORDER — SODIUM CHLORIDE 9 MG/ML
1000 INJECTION, SOLUTION INTRAVENOUS
Refills: 0 | Status: DISCONTINUED | OUTPATIENT
Start: 2023-10-23 | End: 2023-10-23

## 2023-10-23 RX ORDER — DEXTROSE 50 % IN WATER 50 %
15 SYRINGE (ML) INTRAVENOUS ONCE
Refills: 0 | Status: DISCONTINUED | OUTPATIENT
Start: 2023-10-23 | End: 2023-10-23

## 2023-10-23 RX ORDER — GLUCAGON INJECTION, SOLUTION 0.5 MG/.1ML
1 INJECTION, SOLUTION SUBCUTANEOUS ONCE
Refills: 0 | Status: DISCONTINUED | OUTPATIENT
Start: 2023-10-23 | End: 2023-10-23

## 2023-10-23 RX ORDER — INSULIN LISPRO 100/ML
VIAL (ML) SUBCUTANEOUS AT BEDTIME
Refills: 0 | Status: DISCONTINUED | OUTPATIENT
Start: 2023-10-23 | End: 2023-10-23

## 2023-10-23 RX ORDER — DEXTROSE 50 % IN WATER 50 %
12.5 SYRINGE (ML) INTRAVENOUS ONCE
Refills: 0 | Status: DISCONTINUED | OUTPATIENT
Start: 2023-10-23 | End: 2023-10-23

## 2023-10-23 RX ORDER — DEXTROSE 50 % IN WATER 50 %
25 SYRINGE (ML) INTRAVENOUS ONCE
Refills: 0 | Status: DISCONTINUED | OUTPATIENT
Start: 2023-10-23 | End: 2023-10-23

## 2023-10-23 RX ORDER — INSULIN LISPRO 100/ML
VIAL (ML) SUBCUTANEOUS EVERY 6 HOURS
Refills: 0 | Status: DISCONTINUED | OUTPATIENT
Start: 2023-10-23 | End: 2023-10-23

## 2023-10-23 RX ORDER — PANTOPRAZOLE SODIUM 20 MG/1
40 TABLET, DELAYED RELEASE ORAL EVERY 24 HOURS
Refills: 0 | Status: DISCONTINUED | OUTPATIENT
Start: 2023-10-23 | End: 2023-10-25

## 2023-10-23 RX ORDER — ONDANSETRON 8 MG/1
4 TABLET, FILM COATED ORAL ONCE
Refills: 0 | Status: DISCONTINUED | OUTPATIENT
Start: 2023-10-23 | End: 2023-10-25

## 2023-10-23 RX ORDER — DEXTROSE 50 % IN WATER 50 %
12.5 SYRINGE (ML) INTRAVENOUS ONCE
Refills: 0 | Status: DISCONTINUED | OUTPATIENT
Start: 2023-10-23 | End: 2023-10-25

## 2023-10-23 RX ORDER — DEXTROSE 50 % IN WATER 50 %
25 SYRINGE (ML) INTRAVENOUS ONCE
Refills: 0 | Status: DISCONTINUED | OUTPATIENT
Start: 2023-10-23 | End: 2023-10-25

## 2023-10-23 RX ORDER — TETRACAINE/BENZOCAINE/BUTAMBEN 2%-14%-2%
1 OINTMENT (GRAM) TOPICAL EVERY 4 HOURS
Refills: 0 | Status: DISCONTINUED | OUTPATIENT
Start: 2023-10-23 | End: 2023-10-23

## 2023-10-23 RX ORDER — INSULIN LISPRO 100/ML
VIAL (ML) SUBCUTANEOUS
Refills: 0 | Status: DISCONTINUED | OUTPATIENT
Start: 2023-10-23 | End: 2023-10-23

## 2023-10-23 RX ORDER — DEXTROSE 50 % IN WATER 50 %
15 SYRINGE (ML) INTRAVENOUS ONCE
Refills: 0 | Status: DISCONTINUED | OUTPATIENT
Start: 2023-10-23 | End: 2023-10-25

## 2023-10-23 RX ORDER — METOPROLOL TARTRATE 50 MG
5 TABLET ORAL EVERY 6 HOURS
Refills: 0 | Status: DISCONTINUED | OUTPATIENT
Start: 2023-10-23 | End: 2023-10-25

## 2023-10-23 RX ORDER — INFLUENZA VIRUS VACCINE 15; 15; 15; 15 UG/.5ML; UG/.5ML; UG/.5ML; UG/.5ML
0.7 SUSPENSION INTRAMUSCULAR ONCE
Refills: 0 | Status: DISCONTINUED | OUTPATIENT
Start: 2023-10-23 | End: 2023-10-25

## 2023-10-23 RX ORDER — ENOXAPARIN SODIUM 100 MG/ML
30 INJECTION SUBCUTANEOUS EVERY 24 HOURS
Refills: 0 | Status: DISCONTINUED | OUTPATIENT
Start: 2023-10-23 | End: 2023-10-25

## 2023-10-23 RX ORDER — SODIUM CHLORIDE 9 MG/ML
1000 INJECTION, SOLUTION INTRAVENOUS
Refills: 0 | Status: DISCONTINUED | OUTPATIENT
Start: 2023-10-23 | End: 2023-10-24

## 2023-10-23 RX ORDER — INSULIN LISPRO 100/ML
VIAL (ML) SUBCUTANEOUS EVERY 6 HOURS
Refills: 0 | Status: DISCONTINUED | OUTPATIENT
Start: 2023-10-23 | End: 2023-10-24

## 2023-10-23 RX ORDER — SODIUM,POTASSIUM PHOSPHATES 278-250MG
1 POWDER IN PACKET (EA) ORAL ONCE
Refills: 0 | Status: DISCONTINUED | OUTPATIENT
Start: 2023-10-23 | End: 2023-10-23

## 2023-10-23 RX ADMIN — PANTOPRAZOLE SODIUM 40 MILLIGRAM(S): 20 TABLET, DELAYED RELEASE ORAL at 06:05

## 2023-10-23 RX ADMIN — Medication 5 MILLIGRAM(S): at 00:42

## 2023-10-23 NOTE — H&P ADULT - HISTORY OF PRESENT ILLNESS
B TEAM SURGERY  89F w/ PMHx of mild dementia, hypertension, diabetes, TIA and PSHx of parathyroidectomy,  presents with 24hrx of abd pain, multiple episodes of emesis (food, then bile), belching, lethargy. In the ED, pt vomited with PO challenge and was scanned. CT non-con (iodine anaphylaxis) possible early SBO. Daughter reports passing gas in the ED. Uncertain of last BM.  Ambulates with walker. Requires held with all ADL.

## 2023-10-23 NOTE — PATIENT PROFILE ADULT - FUNCTIONAL ASSESSMENT - BASIC MOBILITY 6.
2-calculated by average/Not able to assess (calculate score using [facility] averaging method)

## 2023-10-23 NOTE — H&P ADULT - NSHPLABSRESULTS_GEN_ALL_CORE
T(C): 37.1 (22 Oct 2023 20:30), Max: 37.3 (22 Oct 2023 20:12)  T(F): 98.7 (22 Oct 2023 20:30), Max: 99.1 (22 Oct 2023 20:12)  HR: 88 (22 Oct 2023 20:30) (83 - 90)  BP: 181/87 (22 Oct 2023 20:30) (159/93 - 181/87)  BP(mean): 109 (22 Oct 2023 17:32) (109 - 109)  RR: 18 (22 Oct 2023 20:30) (16 - 18)  SpO2: 100% (22 Oct 2023 20:30) (99% - 100%)    Parameters below as of 22 Oct 2023 20:30  Patient On (Oxygen Delivery Method): room air    LABS:                        13.0   10.63 )-----------( 229      ( 22 Oct 2023 15:02 )             40.2     10-22    141  |  97<L>  |  36<H>  ----------------------------<  202<H>  4.6   |  26  |  1.21    Ca    11.5<H>      22 Oct 2023 15:02    TPro  7.7  /  Alb  4.7  /  TBili  0.7  /  DBili  x   /  AST  19  /  ALT  11  /  AlkPhos  66  10-22    Urinalysis Basic - ( 22 Oct 2023 15:47 )    Color: Yellow / Appearance: Clear / S.014 / pH: x  Gluc: x / Ketone: Negative mg/dL  / Bili: Negative / Urobili: 1.0 mg/dL   Blood: x / Protein: 30 mg/dL / Nitrite: Negative   Leuk Esterase: Negative / RBC: 1 /HPF / WBC 3 /HPF   Sq Epi: x / Non Sq Epi: 11 /HPF / Bacteria: Few /HPF    POCT Blood Glucose.: 203 mg/dL (22 Oct 2023 14:08)    LIVER FUNCTIONS - ( 22 Oct 2023 15:02 )  Alb: 4.7 g/dL / Pro: 7.7 g/dL / ALK PHOS: 66 U/L / ALT: 11 U/L / AST: 19 U/L / GGT: x           RADIOLOGY & ADDITIONAL STUDIES:  ACC: [id number] EXAM:  CT ABDOMEN AND PELVIS   ORDERED BY: RAJ HERNANDEZ   PROCEDURE DATE:  10/22/2023    INTERPRETATION:  CLINICAL INFORMATION: Nausea, vomiting  COMPARISON: CT chest abdomen and pelvis 2022    CONTRAST/COMPLICATIONS:  IV Contrast: NONE  Oral Contrast: NONE  Complications: None reported at time of study completion    PROCEDURE:  CT of the Abdomen and Pelvis was performed.  Sagittal and coronal reformats were performed.    FINDINGS:  LOWER CHEST: Aortic and coronary artery calcifications. Cardiomegaly.   Small hiatal hernia.    LIVER: Within normal limits.  BILE DUCTS: Normal caliber.  GALLBLADDER: Within normal limits.  SPLEEN: Within normal limits.  PANCREAS: Within normal limits.  ADRENALS: Within normal limits.  KIDNEYS/URETERS: No renal stones or hydronephrosis.    BLADDER: Within normal limits.  REPRODUCTIVE ORGANS: Hysterectomy.    BOWEL: Distention of the stomach and small bowel loops with gradual   transition to normal caliber in the ileum in the right lower quadrant.   Appendix is normal. Colonic diverticulosis without acute diverticulitis.  PERITONEUM: No ascites or pneumoperitoneum.  VESSELS: Atherosclerotic changes.  RETROPERITONEUM/LYMPH NODES: No lymphadenopathy.  ABDOMINAL WALL: Within normal limits.  BONES: Degenerative changes.    IMPRESSION:  Dilated fluid-filled loops of small bowel with gradual transition in the   right lower quadrant which may represent an early small bowel   obstruction. NG tube decompression and radiographic follow-up may be   appropriate.

## 2023-10-23 NOTE — H&P ADULT - ATTENDING COMMENTS
presumed adhesive SBO  no acute indication for surgical intervention  will attempt non-op management with bowel rest, IVF, NGT decompression for now  handoff given to day surgeon

## 2023-10-23 NOTE — H&P ADULT - NSHPPHYSICALEXAM_GEN_ALL_CORE
General: NAD, fatigued appearing  HEENT: NC/AT, EOMI, normal hearing  Pulmonary: normal resp effort, patent airway  Cardiovascular: well perfused  Abdominal: soft, tympanic, nondistended  Extremities: WWP, normal strength, thin  Neuro: A/O x 3, CNs II-XII grossly intact,

## 2023-10-23 NOTE — H&P ADULT - NSICDXPASTMEDICALHX_GEN_ALL_CORE_FT
mental status change/respiratory failure
PAST MEDICAL HISTORY:  Arthritis     Cardiomyopathy     Carpal tunnel syndrome     Diverticulitis     DM (diabetes mellitus) type II, controlled, with peripheral vascular disorder     Glaucoma     Gout     Hernia, inguinal, right     HTN (hypertension)     Hyperlipemia     Lewy body dementia     Umbilical hernia

## 2023-10-23 NOTE — PROGRESS NOTE ADULT - ASSESSMENT
89F w/ PMHx of mild dementia, hypertension, diabetes, TIA and PSHx of parathyroidectomy,  presents with 24hrx of abd pain, multiple episodes of emesis (food, then bile), belching, lethargy. In the ED, pt vomited with PO challenge and was scanned. CT non-con (iodine anaphylaxis) possible early SBO. Daughter reports passing gas in the ED. Uncertain of last BM. Ambulates with walker.       PLAN  - IVF  - Refusing NGT at this time  - Diet CLD  - ISS  - If PO contrast study, barium  - DVT PPx: Lovenox  - PO home meds, including plavix, held      Surgery Team B  [id number]

## 2023-10-23 NOTE — PATIENT PROFILE ADULT - FUNCTIONAL ASSESSMENT - BASIC MOBILITY 1.
Billing Type: [employer] Silver Nitrate Text: The wound bed was treated with silver nitrate after the biopsy was performed. Hemostasis: Drysol and Electrocautery Electrodesiccation Text: The wound bed was treated with electrodesiccation after the biopsy was performed. Anesthesia Volume In Cc (Will Not Render If 0): 1 Lab: [facility] Size Of Lesion In Cm: 0.8 Notification Instructions: Patient will be notified of biopsy results. However, patient instructed to call the office if not contacted within 2 weeks. Lab Facility: [facility] Biopsy Type: H and E Render Post-Care Instructions In Note?: no Post-Care Instructions: I reviewed with the patient in detail post-care instructions. Patient is to keep the biopsy site dry overnight, and then apply bacitracin twice daily until healed. Patient may apply hydrogen peroxide soaks to remove any crusting. Dressing: bandage Anesthesia Type: 1% lidocaine with 1:100,000 epinephrine and a 1:6 solution of 8.4% sodium bicarbonate Electrodesiccation And Curettage Text: The wound bed was treated with electrodesiccation and curettage after the biopsy was performed. Detail Level: Detailed No Cryotherapy Text: The wound bed was treated with cryotherapy after the biopsy was performed. Additional Anesthesia Volume In Cc (Will Not Render If 0): 0 Type Of Destruction Used: Curettage - - - Wound Care: Vaseline Consent: Written consent was obtained and risks were reviewed including but not limited to scarring, infection, bleeding, scabbing, incomplete removal, nerve damage and allergy to anesthesia. Biopsy Method: Dermablade Curettage Text: The wound bed was treated with curettage after the biopsy was performed. Lab Facility: [id number] Size Of Lesion In Cm: 0.5 Billing Type: Third-Party Bill Lab: [id number] = A lot of assistance

## 2023-10-23 NOTE — PROGRESS NOTE ADULT - SUBJECTIVE AND OBJECTIVE BOX
TEAM [ B ] Surgery Daily Progress Note  =====================================================    SUBJECTIVE: Patient seen and examined at bedside on AM rounds. Pt currently NPO, but will be advanced to CLD this AM. Per daughter, the mother is passing gas. Pt pulled out NGT overnight. Currently no NGT in place.     PMH:  PAST MEDICAL & SURGICAL HISTORY:  HTN (hypertension)      DM (diabetes mellitus) type II, controlled, with peripheral vascular disorder      Hernia, inguinal, right      Umbilical hernia      Diverticulitis      Cardiomyopathy      Hyperlipemia      Glaucoma      Arthritis      Carpal tunnel syndrome      Gout      Lewy body dementia      H/O:       H/O lumpectomy  both breasts cysts      Senile cataracts of both eyes      H/O hysterectomy for benign disease      H/O parathyroidectomy          ALLERGIES:  penicillin (Rash)  iodine (Hives)  shellfish (Hives)  Vasotec (Unknown)  angiotensin converting enzyme inhibitors (Unknown)  pt allergic to cataloupe (Hives)      --------------------------------------------------------------------------------------    MEDICATIONS:    Neurologic Medications    Respiratory Medications    Cardiovascular Medications  metoprolol tartrate Injectable 5 milliGRAM(s) IV Push every 6 hours    Gastrointestinal Medications  lactated ringers. 1000 milliLiter(s) IV Continuous <Continuous>  pantoprazole  Injectable 40 milliGRAM(s) IV Push every 24 hours    Genitourinary Medications    Hematologic/Oncologic Medications  enoxaparin Injectable 30 milliGRAM(s) SubCutaneous every 24 hours  influenza  Vaccine (HIGH DOSE) 0.7 milliLiter(s) IntraMuscular once    Antimicrobial/Immunologic Medications    Endocrine/Metabolic Medications  dextrose 50% Injectable 25 Gram(s) IV Push once  dextrose 50% Injectable 12.5 Gram(s) IV Push once  dextrose 50% Injectable 25 Gram(s) IV Push once  dextrose Oral Gel 15 Gram(s) Oral once PRN Blood Glucose LESS THAN 70 milliGRAM(s)/deciliter  insulin lispro (ADMELOG) corrective regimen sliding scale   SubCutaneous three times a day before meals  insulin lispro (ADMELOG) corrective regimen sliding scale   SubCutaneous at bedtime    Topical/Other Medications    --------------------------------------------------------------------------------------    VITAL SIGNS:  Vital Signs Last 24 Hrs  T(C): 36.9 (23 Oct 2023 06:00), Max: 37.4 (22 Oct 2023 23:56)  T(F): 98.5 (23 Oct 2023 06:00), Max: 99.4 (22 Oct 2023 23:56)  HR: 70 (23 Oct 2023 06:00) (70 - 90)  BP: 120/55 (23 Oct 2023 06:00) (120/55 - 181/87)  BP(mean): 109 (22 Oct 2023 17:32) (109 - 109)  RR: 18 (23 Oct 2023 06:00) (16 - 18)  SpO2: 98% (23 Oct 2023 06:00) (98% - 100%)    Parameters below as of 23 Oct 2023 06:00  Patient On (Oxygen Delivery Method): room air      --------------------------------------------------------------------------------------    EXAM  General: NAD, fatigued appearing  HEENT: NC/AT, EOMI, normal hearing  Pulmonary: normal resp effort, patent airway  Cardiovascular: well perfused  Abdominal: soft, tympanic, nondistended  Extremities: WWP, normal strength, thin  Neuro: A/O x 3, CNs II-XII grossly intact,      --------------------------------------------------------------------------------------    LABS                        13.0   10.63 )-----------( 229      ( 22 Oct 2023 15:02 )             40.2   10-22    141  |  97<L>  |  36<H>  ----------------------------<  202<H>  4.6   |  26  |  1.21    Ca    11.5<H>      22 Oct 2023 15:02    TPro  7.7  /  Alb  4.7  /  TBili  0.7  /  DBili  x   /  AST  19  /  ALT  11  /  AlkPhos  66  10-22    --------------------------------------------------------------------------------------    INS AND OUTS:  I&O's Detail    22 Oct 2023 07:01  -  23 Oct 2023 07:00  --------------------------------------------------------  IN:    Lactated Ringers: 300 mL  Total IN: 300 mL    OUT:    Nasogastric/Oral tube (mL): 450 mL    Oral Fluid: 0 mL  Total OUT: 450 mL    Total NET: -150 mL    --------------------------------------------------------------------------------------  IMAGING  < from: CT Abdomen and Pelvis No Cont (10.22.23 @ 21:42) >    ACC: [id number] EXAM:  CT ABDOMEN AND PELVIS   ORDERED BY: RAJ HERNANDEZ     PROCEDURE DATE:  10/22/2023          INTERPRETATION:  CLINICAL INFORMATION: Nausea, vomiting    COMPARISON: CT chest abdomen and pelvis 2022    CONTRAST/COMPLICATIONS:  IV Contrast: NONE  Oral Contrast: NONE  Complications: None reported at time of study completion    PROCEDURE:  CT of the Abdomen and Pelvis was performed.  Sagittal and coronal reformats were performed.    FINDINGS:  LOWER CHEST: Aortic and coronary artery calcifications. Cardiomegaly.   Small hiatal hernia.    LIVER: Within normal limits.  BILE DUCTS: Normal caliber.  GALLBLADDER: Within normal limits.  SPLEEN: Within normal limits.  PANCREAS: Within normal limits.  ADRENALS: Within normal limits.  KIDNEYS/URETERS: No renal stones or hydronephrosis.    BLADDER: Within normal limits.  REPRODUCTIVE ORGANS: Hysterectomy.    BOWEL: Distention of the stomach and small bowel loops with gradual   transition to normal caliber in the ileum in the right lower quadrant.   Appendix is normal. Colonic diverticulosis without acute diverticulitis.  PERITONEUM: No ascites or pneumoperitoneum.  VESSELS: Atherosclerotic changes.  RETROPERITONEUM/LYMPH NODES: No lymphadenopathy.  ABDOMINAL WALL: Within normal limits.  BONES: Degenerative changes.    IMPRESSION:  Dilated fluid-filled loops of small bowel with gradual transition in the   right lower quadrant which may represent an early small bowel   obstruction. NG tube decompression and radiographic follow-up may be   appropriate.        --- End of Report ---    < end of copied text >   TEAM [ B ] Surgery Daily Progress Note  =====================================================    SUBJECTIVE: Patient seen and examined at bedside on AM rounds. Pt currently NPO, but will be advanced to CLD this AM. Per daughter, the mother is passing gas. Pt pulled out NGT overnight. Currently no NGT in place.     At 10:30am, pt was found to not be tolerating CLD with hiccups & nausea with feelings of distention. Abdomen soft and non-distended. Pt will be made NPO again.     PMH:  PAST MEDICAL & SURGICAL HISTORY:  HTN (hypertension)      DM (diabetes mellitus) type II, controlled, with peripheral vascular disorder      Hernia, inguinal, right      Umbilical hernia      Diverticulitis      Cardiomyopathy      Hyperlipemia      Glaucoma      Arthritis      Carpal tunnel syndrome      Gout      Lewy body dementia      H/O:       H/O lumpectomy  both breasts cysts      Senile cataracts of both eyes      H/O hysterectomy for benign disease      H/O parathyroidectomy          ALLERGIES:  penicillin (Rash)  iodine (Hives)  shellfish (Hives)  Vasotec (Unknown)  angiotensin converting enzyme inhibitors (Unknown)  pt allergic to cataloupe (Hives)      --------------------------------------------------------------------------------------    MEDICATIONS:    Neurologic Medications    Respiratory Medications    Cardiovascular Medications  metoprolol tartrate Injectable 5 milliGRAM(s) IV Push every 6 hours    Gastrointestinal Medications  lactated ringers. 1000 milliLiter(s) IV Continuous <Continuous>  pantoprazole  Injectable 40 milliGRAM(s) IV Push every 24 hours    Genitourinary Medications    Hematologic/Oncologic Medications  enoxaparin Injectable 30 milliGRAM(s) SubCutaneous every 24 hours  influenza  Vaccine (HIGH DOSE) 0.7 milliLiter(s) IntraMuscular once    Antimicrobial/Immunologic Medications    Endocrine/Metabolic Medications  dextrose 50% Injectable 25 Gram(s) IV Push once  dextrose 50% Injectable 12.5 Gram(s) IV Push once  dextrose 50% Injectable 25 Gram(s) IV Push once  dextrose Oral Gel 15 Gram(s) Oral once PRN Blood Glucose LESS THAN 70 milliGRAM(s)/deciliter  insulin lispro (ADMELOG) corrective regimen sliding scale   SubCutaneous three times a day before meals  insulin lispro (ADMELOG) corrective regimen sliding scale   SubCutaneous at bedtime    Topical/Other Medications    --------------------------------------------------------------------------------------    VITAL SIGNS:  Vital Signs Last 24 Hrs  T(C): 36.9 (23 Oct 2023 06:00), Max: 37.4 (22 Oct 2023 23:56)  T(F): 98.5 (23 Oct 2023 06:00), Max: 99.4 (22 Oct 2023 23:56)  HR: 70 (23 Oct 2023 06:00) (70 - 90)  BP: 120/55 (23 Oct 2023 06:00) (120/55 - 181/87)  BP(mean): 109 (22 Oct 2023 17:32) (109 - 109)  RR: 18 (23 Oct 2023 06:00) (16 - 18)  SpO2: 98% (23 Oct 2023 06:00) (98% - 100%)    Parameters below as of 23 Oct 2023 06:00  Patient On (Oxygen Delivery Method): room air      --------------------------------------------------------------------------------------    EXAM  General: NAD, fatigued appearing  HEENT: NC/AT, EOMI, normal hearing  Pulmonary: normal resp effort, patent airway  Cardiovascular: well perfused  Abdominal: soft, tympanic, nondistended  Extremities: WWP, normal strength, thin  Neuro: A/O x 3, CNs II-XII grossly intact,      --------------------------------------------------------------------------------------    LABS                        13.0   10.63 )-----------( 229      ( 22 Oct 2023 15:02 )             40.2   10    141  |  97<L>  |  36<H>  ----------------------------<  202<H>  4.6   |  26  |  1.21    Ca    11.5<H>      22 Oct 2023 15:02    TPro  7.7  /  Alb  4.7  /  TBili  0.7  /  DBili  x   /  AST  19  /  ALT  11  /  AlkPhos  66  10-22    --------------------------------------------------------------------------------------    INS AND OUTS:  I&O's Detail    22 Oct 2023 07:01  -  23 Oct 2023 07:00  --------------------------------------------------------  IN:    Lactated Ringers: 300 mL  Total IN: 300 mL    OUT:    Nasogastric/Oral tube (mL): 450 mL    Oral Fluid: 0 mL  Total OUT: 450 mL    Total NET: -150 mL    --------------------------------------------------------------------------------------  IMAGING  < from: CT Abdomen and Pelvis No Cont (10.22.23 @ 21:42) >    ACC: [id number] EXAM:  CT ABDOMEN AND PELVIS   ORDERED BY: RAJ HERNANDEZ     PROCEDURE DATE:  10/22/2023          INTERPRETATION:  CLINICAL INFORMATION: Nausea, vomiting    COMPARISON: CT chest abdomen and pelvis 2022    CONTRAST/COMPLICATIONS:  IV Contrast: NONE  Oral Contrast: NONE  Complications: None reported at time of study completion    PROCEDURE:  CT of the Abdomen and Pelvis was performed.  Sagittal and coronal reformats were performed.    FINDINGS:  LOWER CHEST: Aortic and coronary artery calcifications. Cardiomegaly.   Small hiatal hernia.    LIVER: Within normal limits.  BILE DUCTS: Normal caliber.  GALLBLADDER: Within normal limits.  SPLEEN: Within normal limits.  PANCREAS: Within normal limits.  ADRENALS: Within normal limits.  KIDNEYS/URETERS: No renal stones or hydronephrosis.    BLADDER: Within normal limits.  REPRODUCTIVE ORGANS: Hysterectomy.    BOWEL: Distention of the stomach and small bowel loops with gradual   transition to normal caliber in the ileum in the right lower quadrant.   Appendix is normal. Colonic diverticulosis without acute diverticulitis.  PERITONEUM: No ascites or pneumoperitoneum.  VESSELS: Atherosclerotic changes.  RETROPERITONEUM/LYMPH NODES: No lymphadenopathy.  ABDOMINAL WALL: Within normal limits.  BONES: Degenerative changes.    IMPRESSION:  Dilated fluid-filled loops of small bowel with gradual transition in the   right lower quadrant which may represent an early small bowel   obstruction. NG tube decompression and radiographic follow-up may be   appropriate.        --- End of Report ---    < end of copied text >

## 2023-10-23 NOTE — PATIENT PROFILE ADULT - FALL HARM RISK - HARM RISK INTERVENTIONS

## 2023-10-24 LAB
A1C WITH ESTIMATED AVERAGE GLUCOSE RESULT: 5.1 % — SIGNIFICANT CHANGE UP (ref 4–5.6)
A1C WITH ESTIMATED AVERAGE GLUCOSE RESULT: 5.1 % — SIGNIFICANT CHANGE UP (ref 4–5.6)
ANION GAP SERPL CALC-SCNC: 10 MMOL/L — SIGNIFICANT CHANGE UP (ref 7–14)
ANION GAP SERPL CALC-SCNC: 10 MMOL/L — SIGNIFICANT CHANGE UP (ref 7–14)
BUN SERPL-MCNC: 26 MG/DL — HIGH (ref 7–23)
BUN SERPL-MCNC: 26 MG/DL — HIGH (ref 7–23)
CALCIUM SERPL-MCNC: 9.1 MG/DL — SIGNIFICANT CHANGE UP (ref 8.4–10.5)
CALCIUM SERPL-MCNC: 9.1 MG/DL — SIGNIFICANT CHANGE UP (ref 8.4–10.5)
CHLORIDE SERPL-SCNC: 106 MMOL/L — SIGNIFICANT CHANGE UP (ref 98–107)
CHLORIDE SERPL-SCNC: 106 MMOL/L — SIGNIFICANT CHANGE UP (ref 98–107)
CO2 SERPL-SCNC: 24 MMOL/L — SIGNIFICANT CHANGE UP (ref 22–31)
CO2 SERPL-SCNC: 24 MMOL/L — SIGNIFICANT CHANGE UP (ref 22–31)
CREAT SERPL-MCNC: 1.21 MG/DL — SIGNIFICANT CHANGE UP (ref 0.5–1.3)
CREAT SERPL-MCNC: 1.21 MG/DL — SIGNIFICANT CHANGE UP (ref 0.5–1.3)
EGFR: 43 ML/MIN/1.73M2 — LOW
EGFR: 43 ML/MIN/1.73M2 — LOW
ESTIMATED AVERAGE GLUCOSE: 100 — SIGNIFICANT CHANGE UP
ESTIMATED AVERAGE GLUCOSE: 100 — SIGNIFICANT CHANGE UP
GLUCOSE BLDC GLUCOMTR-MCNC: 75 MG/DL — SIGNIFICANT CHANGE UP (ref 70–99)
GLUCOSE BLDC GLUCOMTR-MCNC: 75 MG/DL — SIGNIFICANT CHANGE UP (ref 70–99)
GLUCOSE BLDC GLUCOMTR-MCNC: 80 MG/DL — SIGNIFICANT CHANGE UP (ref 70–99)
GLUCOSE BLDC GLUCOMTR-MCNC: 80 MG/DL — SIGNIFICANT CHANGE UP (ref 70–99)
GLUCOSE BLDC GLUCOMTR-MCNC: 81 MG/DL — SIGNIFICANT CHANGE UP (ref 70–99)
GLUCOSE BLDC GLUCOMTR-MCNC: 81 MG/DL — SIGNIFICANT CHANGE UP (ref 70–99)
GLUCOSE BLDC GLUCOMTR-MCNC: 85 MG/DL — SIGNIFICANT CHANGE UP (ref 70–99)
GLUCOSE BLDC GLUCOMTR-MCNC: 85 MG/DL — SIGNIFICANT CHANGE UP (ref 70–99)
GLUCOSE BLDC GLUCOMTR-MCNC: 86 MG/DL — SIGNIFICANT CHANGE UP (ref 70–99)
GLUCOSE BLDC GLUCOMTR-MCNC: 86 MG/DL — SIGNIFICANT CHANGE UP (ref 70–99)
GLUCOSE SERPL-MCNC: 78 MG/DL — SIGNIFICANT CHANGE UP (ref 70–99)
GLUCOSE SERPL-MCNC: 78 MG/DL — SIGNIFICANT CHANGE UP (ref 70–99)
HCT VFR BLD CALC: 28.8 % — LOW (ref 34.5–45)
HCT VFR BLD CALC: 28.8 % — LOW (ref 34.5–45)
HGB BLD-MCNC: 9.1 G/DL — LOW (ref 11.5–15.5)
HGB BLD-MCNC: 9.1 G/DL — LOW (ref 11.5–15.5)
MAGNESIUM SERPL-MCNC: 1.9 MG/DL — SIGNIFICANT CHANGE UP (ref 1.6–2.6)
MAGNESIUM SERPL-MCNC: 1.9 MG/DL — SIGNIFICANT CHANGE UP (ref 1.6–2.6)
MCHC RBC-ENTMCNC: 24.5 PG — LOW (ref 27–34)
MCHC RBC-ENTMCNC: 24.5 PG — LOW (ref 27–34)
MCHC RBC-ENTMCNC: 31.6 GM/DL — LOW (ref 32–36)
MCHC RBC-ENTMCNC: 31.6 GM/DL — LOW (ref 32–36)
MCV RBC AUTO: 77.4 FL — LOW (ref 80–100)
MCV RBC AUTO: 77.4 FL — LOW (ref 80–100)
NRBC # BLD: 0 /100 WBCS — SIGNIFICANT CHANGE UP (ref 0–0)
NRBC # BLD: 0 /100 WBCS — SIGNIFICANT CHANGE UP (ref 0–0)
NRBC # FLD: 0 K/UL — SIGNIFICANT CHANGE UP (ref 0–0)
NRBC # FLD: 0 K/UL — SIGNIFICANT CHANGE UP (ref 0–0)
PHOSPHATE SERPL-MCNC: 2.6 MG/DL — SIGNIFICANT CHANGE UP (ref 2.5–4.5)
PHOSPHATE SERPL-MCNC: 2.6 MG/DL — SIGNIFICANT CHANGE UP (ref 2.5–4.5)
PLATELET # BLD AUTO: 154 K/UL — SIGNIFICANT CHANGE UP (ref 150–400)
PLATELET # BLD AUTO: 154 K/UL — SIGNIFICANT CHANGE UP (ref 150–400)
POTASSIUM SERPL-MCNC: 3.6 MMOL/L — SIGNIFICANT CHANGE UP (ref 3.5–5.3)
POTASSIUM SERPL-MCNC: 3.6 MMOL/L — SIGNIFICANT CHANGE UP (ref 3.5–5.3)
POTASSIUM SERPL-SCNC: 3.6 MMOL/L — SIGNIFICANT CHANGE UP (ref 3.5–5.3)
POTASSIUM SERPL-SCNC: 3.6 MMOL/L — SIGNIFICANT CHANGE UP (ref 3.5–5.3)
RBC # BLD: 3.72 M/UL — LOW (ref 3.8–5.2)
RBC # BLD: 3.72 M/UL — LOW (ref 3.8–5.2)
RBC # FLD: 14.4 % — SIGNIFICANT CHANGE UP (ref 10.3–14.5)
RBC # FLD: 14.4 % — SIGNIFICANT CHANGE UP (ref 10.3–14.5)
SODIUM SERPL-SCNC: 140 MMOL/L — SIGNIFICANT CHANGE UP (ref 135–145)
SODIUM SERPL-SCNC: 140 MMOL/L — SIGNIFICANT CHANGE UP (ref 135–145)
WBC # BLD: 7.1 K/UL — SIGNIFICANT CHANGE UP (ref 3.8–10.5)
WBC # BLD: 7.1 K/UL — SIGNIFICANT CHANGE UP (ref 3.8–10.5)
WBC # FLD AUTO: 7.1 K/UL — SIGNIFICANT CHANGE UP (ref 3.8–10.5)
WBC # FLD AUTO: 7.1 K/UL — SIGNIFICANT CHANGE UP (ref 3.8–10.5)

## 2023-10-24 RX ORDER — SODIUM CHLORIDE 9 MG/ML
1000 INJECTION, SOLUTION INTRAVENOUS
Refills: 0 | Status: DISCONTINUED | OUTPATIENT
Start: 2023-10-24 | End: 2023-10-24

## 2023-10-24 RX ORDER — POTASSIUM CHLORIDE 20 MEQ
10 PACKET (EA) ORAL
Refills: 0 | Status: DISCONTINUED | OUTPATIENT
Start: 2023-10-24 | End: 2023-10-24

## 2023-10-24 RX ORDER — POTASSIUM PHOSPHATE, MONOBASIC POTASSIUM PHOSPHATE, DIBASIC 236; 224 MG/ML; MG/ML
15 INJECTION, SOLUTION INTRAVENOUS ONCE
Refills: 0 | Status: DISCONTINUED | OUTPATIENT
Start: 2023-10-24 | End: 2023-10-24

## 2023-10-24 RX ORDER — INSULIN LISPRO 100/ML
VIAL (ML) SUBCUTANEOUS
Refills: 0 | Status: DISCONTINUED | OUTPATIENT
Start: 2023-10-24 | End: 2023-10-25

## 2023-10-24 RX ADMIN — ENOXAPARIN SODIUM 30 MILLIGRAM(S): 100 INJECTION SUBCUTANEOUS at 00:10

## 2023-10-24 RX ADMIN — Medication 5 MILLIGRAM(S): at 12:46

## 2023-10-24 RX ADMIN — Medication 5 MILLIGRAM(S): at 17:55

## 2023-10-24 RX ADMIN — PANTOPRAZOLE SODIUM 40 MILLIGRAM(S): 20 TABLET, DELAYED RELEASE ORAL at 05:13

## 2023-10-24 NOTE — PHYSICAL THERAPY INITIAL EVALUATION ADULT - ADDITIONAL COMMENTS
As per patient she lives in a private home with her daughter, patient has 5 steps to enter. Ambulates with rolling walker, denies any falls within the last 6 months. Patient has a home health aide presents 2 days a week for 8 hours each day. Patient has rolling walker, cane, shower chair, and wheelchair at home.    Patient left sitting in recliner at bedside, NAD, all lines and tubes intact, call eldridge within reach, MIKY Adame made aware.

## 2023-10-24 NOTE — PROGRESS NOTE ADULT - ATTENDING COMMENTS
The patient was seen and examined, chart and notes reviewed.  The current diagnosis, plan of care and alternatives have been discussed with the patient.  All questions have been answered and updates have been discussed.  The case was discussed with B team residents/PA's and medical students at morning B surgical rounds and throughout the course of the day.    Adhesive small bowel obstruction    a.  Patient with nausea and vomiting  b.  Note of flatus/bowel movements within the last 24 hours  c.  Remains hemodynamically normal and stable  d. ay advance diet   e.  Wean intravenous fluid as needed  f.  Continue chemical DVT prophylaxis with SQH / Lovenox  g. Replete electrolytes as needed  h. Encourage ambulation

## 2023-10-24 NOTE — PHYSICAL THERAPY INITIAL EVALUATION ADULT - PERTINENT HX OF CURRENT PROBLEM, REHAB EVAL
Patient is an 89 year old female with PMHx of mild dementia, hypertension, diabetes, TIA and PSHx of parathyroidectomy,  presents with 24hrx of abd pain, multiple episodes of emesis (food, then bile), belching, lethargy. In the ED, pt vomited with PO challenge and was scanned. CT non-con (iodine anaphylaxis) possible early small bowel obstruction.

## 2023-10-24 NOTE — PHYSICAL THERAPY INITIAL EVALUATION ADULT - GENERAL OBSERVATIONS, REHAB EVAL
Patient found semi-reclined in bed, NAD, A&Ox3, +IV pole, daughters at bedside, patient agreeable to participate in skilled PT evaluation, spO2 98% on room air

## 2023-10-24 NOTE — PHYSICAL THERAPY INITIAL EVALUATION ADULT - MANUAL MUSCLE TESTING RESULTS, REHAB EVAL
Patients bilateral upper and lower extremity strength grossly 3+/5 upon MMT and functional assessments

## 2023-10-24 NOTE — PROGRESS NOTE ADULT - ASSESSMENT
Assessment:   89F w/ PMHx of mild dementia, hypertension, diabetes, TIA and PSHx of parathyroidectomy,  presents with 24hrx of abd pain, multiple episodes of emesis (food, then bile), belching, lethargy. In the ED, pt vomited with PO challenge and was scanned. CT non-con (iodine anaphylaxis) possible early SBO. Daughter reports passing gas in the ED. Uncertain of last BM. Ambulates with walker.       Plan:  - NPO/IVF  - ISS  - If PO contrast study, barium  - DVT PPx: Lovenox  - PO home meds, including plavix, held      Surgery Team B  [id number]   Assessment:   89F w/ PMHx of mild dementia, hypertension, diabetes, TIA and PSHx of parathyroidectomy,  presents with 24hrx of abd pain, multiple episodes of emesis (food, then bile), belching, lethargy. In the ED, pt vomited with PO challenge and was scanned. CT non-con (iodine anaphylaxis) possible early SBO. Daughter reports passing gas in the ED. Uncertain of last BM. Ambulates with walker.       Plan:  - CLD/IVF  - ISS  - If PO contrast study, barium  - DVT PPx: Lovenox  - PO home meds, including plavix, held      Surgery Team B  [id number]

## 2023-10-24 NOTE — PROGRESS NOTE ADULT - SUBJECTIVE AND OBJECTIVE BOX
TEAM B Surgery Daily Progress Note  =====================================================    SUBJECTIVE: Patient seen and examined at bedside on AM rounds. Advanced to CLD yesterday but was not tolerating with hiccups, nausea, and distention.  Pt made NPO again.  +flatus.     ALLERGIES:  penicillin (Rash)  iodine (Hives)  shellfish (Hives)  Vasotec (Unknown)  angiotensin converting enzyme inhibitors (Unknown)  pt allergic to cataloupe (Hives)      --------------------------------------------------------------------------------------    MEDICATIONS:    Neurologic Medications  ondansetron Injectable 4 milliGRAM(s) IV Push once    Respiratory Medications    Cardiovascular Medications  metoprolol tartrate Injectable 5 milliGRAM(s) IV Push every 6 hours    Gastrointestinal Medications  lactated ringers. 1000 milliLiter(s) IV Continuous <Continuous>  pantoprazole  Injectable 40 milliGRAM(s) IV Push every 24 hours    Genitourinary Medications    Hematologic/Oncologic Medications  enoxaparin Injectable 30 milliGRAM(s) SubCutaneous every 24 hours  influenza  Vaccine (HIGH DOSE) 0.7 milliLiter(s) IntraMuscular once    Antimicrobial/Immunologic Medications    Endocrine/Metabolic Medications  dextrose 50% Injectable 25 Gram(s) IV Push once  dextrose 50% Injectable 12.5 Gram(s) IV Push once  dextrose 50% Injectable 25 Gram(s) IV Push once  dextrose Oral Gel 15 Gram(s) Oral once PRN Blood Glucose LESS THAN 70 milliGRAM(s)/deciliter  insulin lispro (ADMELOG) corrective regimen sliding scale   SubCutaneous every 6 hours    Topical/Other Medications    --------------------------------------------------------------------------------------    VITAL SIGNS:  T(C): 36.7 (10-24-23 @ 05:00), Max: 37.2 (10-23-23 @ 18:00)  HR: 57 (10-24-23 @ 05:00) (57 - 67)  BP: 141/65 (10-24-23 @ 05:00) (117/55 - 142/58)  RR: 18 (10-24-23 @ 05:00) (16 - 18)  SpO2: 100% (10-24-23 @ 05:00) (97% - 100%)  --------------------------------------------------------------------------------------    INS AND OUTS:    10-23-23 @ 07:01  -  10-24-23 @ 07:00  --------------------------------------------------------  IN: 1965 mL / OUT: 0 mL / NET: 1965 mL      --------------------------------------------------------------------------------------    EXAM  General: NAD, fatigued appearing  HEENT: NC/AT, EOMI, normal hearing  Pulmonary: normal resp effort, patent airway  Cardiovascular: well perfused  Abdominal: soft, tympanic, nondistended  Extremities: WWP, normal strength, thin  Neuro: A/O x 3, CNs II-XII grossly intact    --------------------------------------------------------------------------------------    LABS

## 2023-10-25 ENCOUNTER — TRANSCRIPTION ENCOUNTER (OUTPATIENT)
Age: 88
End: 2023-10-25

## 2023-10-25 VITALS
RESPIRATION RATE: 17 BRPM | DIASTOLIC BLOOD PRESSURE: 60 MMHG | SYSTOLIC BLOOD PRESSURE: 116 MMHG | TEMPERATURE: 98 F | HEART RATE: 70 BPM | OXYGEN SATURATION: 100 %

## 2023-10-25 LAB
ANION GAP SERPL CALC-SCNC: 10 MMOL/L — SIGNIFICANT CHANGE UP (ref 7–14)
ANION GAP SERPL CALC-SCNC: 10 MMOL/L — SIGNIFICANT CHANGE UP (ref 7–14)
BUN SERPL-MCNC: 20 MG/DL — SIGNIFICANT CHANGE UP (ref 7–23)
BUN SERPL-MCNC: 20 MG/DL — SIGNIFICANT CHANGE UP (ref 7–23)
CALCIUM SERPL-MCNC: 9.6 MG/DL — SIGNIFICANT CHANGE UP (ref 8.4–10.5)
CALCIUM SERPL-MCNC: 9.6 MG/DL — SIGNIFICANT CHANGE UP (ref 8.4–10.5)
CHLORIDE SERPL-SCNC: 106 MMOL/L — SIGNIFICANT CHANGE UP (ref 98–107)
CHLORIDE SERPL-SCNC: 106 MMOL/L — SIGNIFICANT CHANGE UP (ref 98–107)
CO2 SERPL-SCNC: 25 MMOL/L — SIGNIFICANT CHANGE UP (ref 22–31)
CO2 SERPL-SCNC: 25 MMOL/L — SIGNIFICANT CHANGE UP (ref 22–31)
CREAT SERPL-MCNC: 1.03 MG/DL — SIGNIFICANT CHANGE UP (ref 0.5–1.3)
CREAT SERPL-MCNC: 1.03 MG/DL — SIGNIFICANT CHANGE UP (ref 0.5–1.3)
EGFR: 52 ML/MIN/1.73M2 — LOW
EGFR: 52 ML/MIN/1.73M2 — LOW
GLUCOSE BLDC GLUCOMTR-MCNC: 82 MG/DL — SIGNIFICANT CHANGE UP (ref 70–99)
GLUCOSE BLDC GLUCOMTR-MCNC: 82 MG/DL — SIGNIFICANT CHANGE UP (ref 70–99)
GLUCOSE BLDC GLUCOMTR-MCNC: 94 MG/DL — SIGNIFICANT CHANGE UP (ref 70–99)
GLUCOSE BLDC GLUCOMTR-MCNC: 94 MG/DL — SIGNIFICANT CHANGE UP (ref 70–99)
GLUCOSE SERPL-MCNC: 75 MG/DL — SIGNIFICANT CHANGE UP (ref 70–99)
GLUCOSE SERPL-MCNC: 75 MG/DL — SIGNIFICANT CHANGE UP (ref 70–99)
HCT VFR BLD CALC: 29.5 % — LOW (ref 34.5–45)
HCT VFR BLD CALC: 29.5 % — LOW (ref 34.5–45)
HGB BLD-MCNC: 9.5 G/DL — LOW (ref 11.5–15.5)
HGB BLD-MCNC: 9.5 G/DL — LOW (ref 11.5–15.5)
MAGNESIUM SERPL-MCNC: 1.8 MG/DL — SIGNIFICANT CHANGE UP (ref 1.6–2.6)
MAGNESIUM SERPL-MCNC: 1.8 MG/DL — SIGNIFICANT CHANGE UP (ref 1.6–2.6)
MCHC RBC-ENTMCNC: 24.7 PG — LOW (ref 27–34)
MCHC RBC-ENTMCNC: 24.7 PG — LOW (ref 27–34)
MCHC RBC-ENTMCNC: 32.2 GM/DL — SIGNIFICANT CHANGE UP (ref 32–36)
MCHC RBC-ENTMCNC: 32.2 GM/DL — SIGNIFICANT CHANGE UP (ref 32–36)
MCV RBC AUTO: 76.8 FL — LOW (ref 80–100)
MCV RBC AUTO: 76.8 FL — LOW (ref 80–100)
NRBC # BLD: 0 /100 WBCS — SIGNIFICANT CHANGE UP (ref 0–0)
NRBC # BLD: 0 /100 WBCS — SIGNIFICANT CHANGE UP (ref 0–0)
NRBC # FLD: 0 K/UL — SIGNIFICANT CHANGE UP (ref 0–0)
NRBC # FLD: 0 K/UL — SIGNIFICANT CHANGE UP (ref 0–0)
PHOSPHATE SERPL-MCNC: 2.3 MG/DL — LOW (ref 2.5–4.5)
PHOSPHATE SERPL-MCNC: 2.3 MG/DL — LOW (ref 2.5–4.5)
PLATELET # BLD AUTO: 154 K/UL — SIGNIFICANT CHANGE UP (ref 150–400)
PLATELET # BLD AUTO: 154 K/UL — SIGNIFICANT CHANGE UP (ref 150–400)
POTASSIUM SERPL-MCNC: 3.3 MMOL/L — LOW (ref 3.5–5.3)
POTASSIUM SERPL-MCNC: 3.3 MMOL/L — LOW (ref 3.5–5.3)
POTASSIUM SERPL-SCNC: 3.3 MMOL/L — LOW (ref 3.5–5.3)
POTASSIUM SERPL-SCNC: 3.3 MMOL/L — LOW (ref 3.5–5.3)
RBC # BLD: 3.84 M/UL — SIGNIFICANT CHANGE UP (ref 3.8–5.2)
RBC # BLD: 3.84 M/UL — SIGNIFICANT CHANGE UP (ref 3.8–5.2)
RBC # FLD: 14.3 % — SIGNIFICANT CHANGE UP (ref 10.3–14.5)
RBC # FLD: 14.3 % — SIGNIFICANT CHANGE UP (ref 10.3–14.5)
SODIUM SERPL-SCNC: 141 MMOL/L — SIGNIFICANT CHANGE UP (ref 135–145)
SODIUM SERPL-SCNC: 141 MMOL/L — SIGNIFICANT CHANGE UP (ref 135–145)
WBC # BLD: 6.67 K/UL — SIGNIFICANT CHANGE UP (ref 3.8–10.5)
WBC # BLD: 6.67 K/UL — SIGNIFICANT CHANGE UP (ref 3.8–10.5)
WBC # FLD AUTO: 6.67 K/UL — SIGNIFICANT CHANGE UP (ref 3.8–10.5)
WBC # FLD AUTO: 6.67 K/UL — SIGNIFICANT CHANGE UP (ref 3.8–10.5)

## 2023-10-25 PROCEDURE — 99238 HOSP IP/OBS DSCHRG MGMT 30/<: CPT | Mod: GC

## 2023-10-25 RX ORDER — MAGNESIUM SULFATE 500 MG/ML
1 VIAL (ML) INJECTION ONCE
Refills: 0 | Status: COMPLETED | OUTPATIENT
Start: 2023-10-25 | End: 2023-10-25

## 2023-10-25 RX ORDER — SODIUM,POTASSIUM PHOSPHATES 278-250MG
3 POWDER IN PACKET (EA) ORAL ONCE
Refills: 0 | Status: COMPLETED | OUTPATIENT
Start: 2023-10-25 | End: 2023-10-25

## 2023-10-25 RX ORDER — ACETAMINOPHEN 500 MG
650 TABLET ORAL EVERY 6 HOURS
Refills: 0 | Status: DISCONTINUED | OUTPATIENT
Start: 2023-10-25 | End: 2023-10-25

## 2023-10-25 RX ADMIN — PANTOPRAZOLE SODIUM 40 MILLIGRAM(S): 20 TABLET, DELAYED RELEASE ORAL at 05:38

## 2023-10-25 RX ADMIN — Medication 100 GRAM(S): at 09:01

## 2023-10-25 RX ADMIN — ENOXAPARIN SODIUM 30 MILLIGRAM(S): 100 INJECTION SUBCUTANEOUS at 00:14

## 2023-10-25 RX ADMIN — Medication 3 PACKET(S): at 09:01

## 2023-10-25 RX ADMIN — Medication 5 MILLIGRAM(S): at 05:39

## 2023-10-25 NOTE — DISCHARGE NOTE NURSING/CASE MANAGEMENT/SOCIAL WORK - PATIENT PORTAL LINK FT
You can access the FollowMyHealth Patient Portal offered by [facility] by registering at the following website: [email or web address]. By joining [facility]’s FollowMyHealth portal, you will also be able to view your health information using other applications (apps) compatible with our system.

## 2023-10-25 NOTE — PROGRESS NOTE ADULT - ASSESSMENT
89F w/ PMHx of mild dementia, hypertension, diabetes, TIA and PSHx of parathyroidectomy,  presents with 24hrx of abd pain, multiple episodes of emesis (food, then bile), belching, lethargy. In the ED, pt vomited with PO challenge and was scanned. CT non-con (iodine anaphylaxis) possible early SBO. Daughter reports passing gas in the ED. Uncertain of last BM. Ambulates with walker.       Plan:  - Regular diet  - ISS, OOB  - DVT PPx: Lovenox  - Dispo planning, restart home Plavix on discharge      Surgery Team B  [id number]

## 2023-10-25 NOTE — DISCHARGE NOTE PROVIDER - NSDCFUADDINST_GEN_ALL_CORE_FT
You were admitted to the Hospital with a Small Bowel Obstruction which resolved with medical management (nothing by mouth/IV Fluids/serial abdominal exams). You are currently passing flatus and you are tolerating a Regular Diet without nausea or emesis. If you start vomiting or becoming very nauseated or bloated or stop passing flatus or bowel movement while at home, please contact Dr. Hector or go to your nearest Emergency Department.  Please restart your home Plavix.

## 2023-10-25 NOTE — DISCHARGE NOTE PROVIDER - CARE PROVIDER_API CALL
Omid Hector  Surgery  [address] [place] [address], Level [place] [address]  Phone: [phone]  Fax: [phone]  Follow Up Time:

## 2023-10-25 NOTE — DISCHARGE NOTE PROVIDER - CARE PROVIDERS DIRECT ADDRESSES
[email or web address]

## 2023-10-25 NOTE — DISCHARGE NOTE PROVIDER - NSDCCPCAREPLAN_GEN_ALL_CORE_FT
PRINCIPAL DISCHARGE DIAGNOSIS  Diagnosis: Small bowel obstruction  Assessment and Plan of Treatment: Now resolved with non-operative management

## 2023-10-25 NOTE — PROGRESS NOTE ADULT - SUBJECTIVE AND OBJECTIVE BOX
Surgery Daily Progress Note    Subjective:   Patient seen at bedside this AM. Reports feeling well, without complaints. +Gas per family.  Tolerating diet without N/V.       Objective:  Vital Signs  T(C): 37.1 (10-25 @ 02:31), Max: 37.1 (10-25 @ 02:31)  HR: 65 (10-25 @ 02:31) (57 - 72)  BP: 138/67 (10-25 @ 02:31) (133/60 - 158/74)  RR: 16 (10-25 @ 02:31) (16 - 19)  SpO2: 100% (10-25 @ 02:31) (99% - 100%)  10-24-23 @ 07:01  -  10-25-23 @ 07:00  --------------------------------------------------------  IN:  Total IN: 0 mL    OUT:    Voided (mL): 600 mL  Total OUT: 600 mL    Total NET: -600 mL          Physical Exam:  GEN: resting in bed comfortably in NAD  RESP: no increased WOB  ABD: soft, non-distended, non-tender without rebound tenderness or guarding  EXTR: warm, well-perfused without gross deformities; spontaneous movement in b/l U/L extrem  NEURO: AAOx4    Labs:                        9.5    6.67  )-----------( 154      ( 25 Oct 2023 07:02 )             29.5   10-25    141  |  106  |  20  ----------------------------<  75  3.3<L>   |  25  |  1.03    Ca    9.6      25 Oct 2023 07:02  Phos  2.3     10-25  Mg     1.80     10-25      CAPILLARY BLOOD GLUCOSE      POCT Blood Glucose.: 82 mg/dL (25 Oct 2023 08:20)  POCT Blood Glucose.: 80 mg/dL (24 Oct 2023 21:47)  POCT Blood Glucose.: 86 mg/dL (24 Oct 2023 17:38)  POCT Blood Glucose.: 75 mg/dL (24 Oct 2023 12:03)  POCT Blood Glucose.: 81 mg/dL (24 Oct 2023 08:52)      Medications:   MEDICATIONS  (STANDING):  dextrose 50% Injectable 25 Gram(s) IV Push once  dextrose 50% Injectable 12.5 Gram(s) IV Push once  dextrose 50% Injectable 25 Gram(s) IV Push once  enoxaparin Injectable 30 milliGRAM(s) SubCutaneous every 24 hours  influenza  Vaccine (HIGH DOSE) 0.7 milliLiter(s) IntraMuscular once  insulin lispro (ADMELOG) corrective regimen sliding scale   SubCutaneous Before meals and at bedtime  magnesium sulfate  IVPB 1 Gram(s) IV Intermittent once  metoprolol tartrate Injectable 5 milliGRAM(s) IV Push every 6 hours  ondansetron Injectable 4 milliGRAM(s) IV Push once  pantoprazole  Injectable 40 milliGRAM(s) IV Push every 24 hours  potassium phosphate / sodium phosphate Powder (PHOS-NaK) 3 Packet(s) Oral once    MEDICATIONS  (PRN):  acetaminophen     Tablet .. 650 milliGRAM(s) Oral every 6 hours PRN Moderate Pain (4 - 6)  dextrose Oral Gel 15 Gram(s) Oral once PRN Blood Glucose LESS THAN 70 milliGRAM(s)/deciliter      Imaging:  < from: CT Abdomen and Pelvis No Cont (10.22.23 @ 21:42) >  ACC: [id number] EXAM:  CT ABDOMEN AND PELVIS   ORDERED BY: RAJ HERNANDEZ     PROCEDURE DATE:  10/22/2023          INTERPRETATION:  CLINICAL INFORMATION: Nausea, vomiting    COMPARISON: CT chest abdomen and pelvis 4/21/2022    CONTRAST/COMPLICATIONS:  IV Contrast: NONE  Oral Contrast: NONE  Complications: None reported at time of study completion    PROCEDURE:  CT of the Abdomen and Pelvis was performed.  Sagittal and coronal reformats were performed.    FINDINGS:  LOWER CHEST: Aortic and coronary artery calcifications. Cardiomegaly.   Small hiatal hernia.    LIVER: Within normal limits.  BILE DUCTS: Normal caliber.  GALLBLADDER: Within normal limits.  SPLEEN: Within normal limits.  PANCREAS: Within normal limits.  ADRENALS: Within normal limits.  KIDNEYS/URETERS: No renal stones or hydronephrosis.    BLADDER: Within normal limits.  REPRODUCTIVE ORGANS: Hysterectomy.    BOWEL: Distention of the stomach and small bowel loops with gradual   transition to normal caliber in the ileum in the right lower quadrant.   Appendix is normal. Colonic diverticulosis without acute diverticulitis.  PERITONEUM: No ascites or pneumoperitoneum.  VESSELS: Atherosclerotic changes.  RETROPERITONEUM/LYMPH NODES: No lymphadenopathy.  ABDOMINAL WALL: Within normal limits.  BONES: Degenerative changes.    IMPRESSION:  Dilated fluid-filled loops of small bowel with gradual transition in the   right lower quadrant which may represent an early small bowel   obstruction. NG tube decompression and radiographic follow-up may be   appropriate.    < end of copied text >

## 2023-10-25 NOTE — PROGRESS NOTE ADULT - ATTENDING COMMENTS
The patient was seen and examined, chart and notes reviewed.  The current diagnosis, plan of care and alternatives have been discussed with the patient.  All questions have been answered and updates have been discussed.  The case was discussed with B team residents/PA's and medical students at morning B surgical rounds and throughout the course of the day.    Adhesive small bowel obstruction    a.  Patient denies abdominal pain, nausea and vomiting  b.  Note of flatus/bowel movements within the last 24 hours  c.  Remains hemodynamically normal and stable  d. Tolerating clears, may advance diet   e.  Wean intravenous fluid as needed  f.  Continue chemical DVT prophylaxis with SQH / Lovenox  g. Replete electrolytes as needed  h. Encourage ambulation  i.  Began Discharge planning

## 2023-10-25 NOTE — DISCHARGE NOTE PROVIDER - HOSPITAL COURSE
89F w/ PMHx of mild dementia, hypertension, diabetes, TIA and PSHx of parathyroidectomy,  presents with 24hrx of abd pain,   multiple episodes of emesis (food, then bile), belching, lethargy. In the ED, pt vomited with PO challenge and was scanned. CT non-con   (iodine anaphylaxis) possible early SBO. Daughter reports passing gas in the ED. Uncertain of last BM. Ambulates with walker. Requires   help with all ADL. Patient was admitted to [facility] 10/23/23 and treated non-operatively with NPO/NGT/IVF/serial abdominal exams. As patient   began passing flatus, diet was advanced as tolerated from clears to regular diet. Patient is currently tolerating regular diet, ambulating at   baseline and voiding. PT recommended home PT. Since patient did not have surgery, patient outpatient PT script given to patient per Case  Management. Per Dr. Hector, patient is currently stable for d/c home and restart home plavix. Patient will follow up with Dr. Hector as  outpatient. Patient and daughter understand and agree with above.     89F w/ PMHx of mild dementia, hypertension, diabetes, TIA and PSHx of parathyroidectomy,  presents with 24hrx of abd pain,   multiple episodes of emesis (food, then bile), belching, lethargy. In the ED, pt vomited with PO challenge and was scanned. CT non-con   (iodine anaphylaxis) possible early SBO. Daughter reports passing gas in the ED. Uncertain of last BM. Ambulates with walker. Requires   help with all ADL. Patient was admitted to [facility] 10/23/23 and treated non-operatively with NPO/IVF/serial abdominal exams. As patient   began passing flatus, diet was advanced as tolerated from clears to regular diet. Patient is currently tolerating regular diet, ambulating at   baseline and voiding. PT recommended home PT. Since patient did not have surgery, patient outpatient PT script given to patient per Case  Management. Per Dr. Hector, patient is currently stable for d/c home and restart home plavix. Patient will follow up with Dr. Hector as  outpatient. Patient and daughter understand and agree with above.

## 2023-10-25 NOTE — DISCHARGE NOTE PROVIDER - NSDCMRMEDTOKEN_GEN_ALL_CORE_FT
Colace 100 mg oral capsule: 2 cap(s) orally once a day  Metoprolol Tartrate 25 mg oral tablet: 1 tab(s) orally 2 times a day  NexIUM 24HR 20 mg oral delayed release capsule: 1 cap(s) orally once a day  Physical Therapy: Outpatient Physical Therapy for reconditioning  Plavix 75 mg oral tablet: 1 tab(s) orally once a day   triamterene-hydrochlorothiazide 37.5 mg-25 mg oral tablet: 1 tab(s) orally every other day   Colace 100 mg oral capsule: 2 cap(s) orally once a day  Metoprolol Tartrate 25 mg oral tablet: 1 tab(s) orally 2 times a day  NexIUM 24HR 20 mg oral delayed release capsule: 1 cap(s) orally once a day  Physical Therapy: Outpatient Physical Therapy for reconditioning three times a week  Plavix 75 mg oral tablet: 1 tab(s) orally once a day   triamterene-hydrochlorothiazide 37.5 mg-25 mg oral tablet: 1 tab(s) orally every other day

## 2024-02-08 NOTE — PHYSICAL THERAPY INITIAL EVALUATION ADULT - BALANCE DISTURBANCE, IDENTIFIED IMPAIRMENT CONTRIBUTE, REHAB EVAL
Left voice message for patient regarding anticoagulation monitoring.  Last INR on 2-5-24 was 2.1.  Dose maintained.   Today's INR is 2.1 and is within goal range.    Current warfarin total weekly dose of 87.5 mg verified.  Informed the INR result is within therapeutic range and instructed to maintain current dose per protocol. Discussed dose and return date of 2-14-24 for next INR. See Anticoagulation flowsheet.    Dr Cerda is in the office today supervising the treatment.    Instructed to contact the clinic with any unusual bleeding or bruising, any changes in medications, diet, health status, lifestyle, or any other changes, questions or concerns. Verbalized understanding of all discussed.     
decreased ROM/decreased strength

## 2024-02-20 NOTE — H&P ADULT - ASSESSMENT
DAY SHIFT    Pt up ad vivian on unit. Pt is hyper verbal and manic. Pt is labile and disorganized. Pt is seen running in hallways, doing jumping jacks, moving chairs around. PRN ativan given and accepted by pt due to escalating behavior, pt is seen around unit fighting sleep and is advised to go lay down in his room. Pt needs frequent redirection. Pt is reluctant to take medication but takes it with encouragement. Pt remains labile for the rest of the shift. Close observations continued to ensure pt safety.    89F w/ PMHx of mild dementia, hypertension, diabetes, TIA and PSHx of parathyroidectomy,  presents with 24hrx of abd pain, multiple episodes of emesis (food, then bile), belching, lethargy. In the ED, pt vomited with PO challenge and was scanned. CT non-con (iodine anaphylaxis) possible early SBO. Daughter reports passing gas in the ED. Uncertain of last BM. Ambulates with walker. Requires held with all ADL.  ***iodine anaphylaxis***      PLAN  - Diet: NGT / IVF / AROBF  - ISS  - If PO contrast study, barium  - DVT PPx: Lovenox  - PO home meds, including plavix, held    Discussed with Dr. Le    Surgery Team B  [id number]

## 2025-03-18 NOTE — ED PROVIDER NOTE - CPE EDP [facility] NORM
Discharge Facility:[facility]  Discharge Diagnosis:Hyponatremia  Admission Date:3/3/25  Discharge Date: 3/14/25    PCP Appointment Date:Task to office staff  Specialist Appointment Date: N/A  Hospital Encounter and Summary Linked: Yes  See discharge assessment below for further details      Two attempts were made to reach patient within two business days after discharge. Voicemail left with contact information for patient to call back with any non-emergent questions or concerns.      Chaya Morley LPN    
normal...

## 2025-05-29 ENCOUNTER — EMERGENCY (EMERGENCY)
Facility: HOSPITAL | Age: 89
LOS: 1 days | End: 2025-05-29
Attending: STUDENT IN AN ORGANIZED HEALTH CARE EDUCATION/TRAINING PROGRAM
Payer: MEDICARE

## 2025-05-29 VITALS
SYSTOLIC BLOOD PRESSURE: 128 MMHG | HEART RATE: 62 BPM | OXYGEN SATURATION: 99 % | RESPIRATION RATE: 16 BRPM | DIASTOLIC BLOOD PRESSURE: 80 MMHG | TEMPERATURE: 98 F

## 2025-05-29 VITALS
SYSTOLIC BLOOD PRESSURE: 144 MMHG | HEIGHT: 66 IN | RESPIRATION RATE: 20 BRPM | DIASTOLIC BLOOD PRESSURE: 74 MMHG | WEIGHT: 125 LBS | TEMPERATURE: 98 F | OXYGEN SATURATION: 99 % | HEART RATE: 66 BPM

## 2025-05-29 DIAGNOSIS — Z90.710 ACQUIRED ABSENCE OF BOTH CERVIX AND UTERUS: Chronic | ICD-10-CM

## 2025-05-29 DIAGNOSIS — Z98.890 OTHER SPECIFIED POSTPROCEDURAL STATES: Chronic | ICD-10-CM

## 2025-05-29 DIAGNOSIS — Z98.891 HISTORY OF UTERINE SCAR FROM PREVIOUS SURGERY: Chronic | ICD-10-CM

## 2025-05-29 DIAGNOSIS — H25.9 UNSPECIFIED AGE-RELATED CATARACT: Chronic | ICD-10-CM

## 2025-05-29 LAB
ALBUMIN SERPL ELPH-MCNC: 3.6 G/DL — SIGNIFICANT CHANGE UP (ref 3.3–5)
ALP SERPL-CCNC: 73 U/L — SIGNIFICANT CHANGE UP (ref 40–120)
ALT FLD-CCNC: 10 U/L — SIGNIFICANT CHANGE UP (ref 10–45)
ANION GAP SERPL CALC-SCNC: 13 MMOL/L — SIGNIFICANT CHANGE UP (ref 5–17)
AST SERPL-CCNC: 18 U/L — SIGNIFICANT CHANGE UP (ref 10–40)
BASOPHILS # BLD AUTO: 0.06 K/UL — SIGNIFICANT CHANGE UP (ref 0–0.2)
BASOPHILS NFR BLD AUTO: 0.6 % — SIGNIFICANT CHANGE UP (ref 0–2)
BILIRUB SERPL-MCNC: 0.3 MG/DL — SIGNIFICANT CHANGE UP (ref 0.2–1.2)
BUN SERPL-MCNC: 32 MG/DL — HIGH (ref 7–23)
CALCIUM SERPL-MCNC: 11.2 MG/DL — HIGH (ref 8.4–10.5)
CHLORIDE SERPL-SCNC: 104 MMOL/L — SIGNIFICANT CHANGE UP (ref 96–108)
CO2 SERPL-SCNC: 22 MMOL/L — SIGNIFICANT CHANGE UP (ref 22–31)
CREAT SERPL-MCNC: 1.46 MG/DL — HIGH (ref 0.5–1.3)
EGFR: 34 ML/MIN/1.73M2 — LOW
EGFR: 34 ML/MIN/1.73M2 — LOW
EOSINOPHIL # BLD AUTO: 0.11 K/UL — SIGNIFICANT CHANGE UP (ref 0–0.5)
EOSINOPHIL NFR BLD AUTO: 1.2 % — SIGNIFICANT CHANGE UP (ref 0–6)
GAS PNL BLDV: SIGNIFICANT CHANGE UP
GLUCOSE SERPL-MCNC: 125 MG/DL — HIGH (ref 70–99)
HCT VFR BLD CALC: 32.4 % — LOW (ref 34.5–45)
HGB BLD-MCNC: 10.3 G/DL — LOW (ref 11.5–15.5)
IMM GRANULOCYTES NFR BLD AUTO: 1.1 % — HIGH (ref 0–0.9)
LYMPHOCYTES # BLD AUTO: 1.35 K/UL — SIGNIFICANT CHANGE UP (ref 1–3.3)
LYMPHOCYTES # BLD AUTO: 14.2 % — SIGNIFICANT CHANGE UP (ref 13–44)
MAGNESIUM SERPL-MCNC: 2.3 MG/DL — SIGNIFICANT CHANGE UP (ref 1.6–2.6)
MCHC RBC-ENTMCNC: 24.4 PG — LOW (ref 27–34)
MCHC RBC-ENTMCNC: 31.8 G/DL — LOW (ref 32–36)
MCV RBC AUTO: 76.8 FL — LOW (ref 80–100)
MONOCYTES # BLD AUTO: 1.08 K/UL — HIGH (ref 0–0.9)
MONOCYTES NFR BLD AUTO: 11.4 % — SIGNIFICANT CHANGE UP (ref 2–14)
NEUTROPHILS # BLD AUTO: 6.81 K/UL — SIGNIFICANT CHANGE UP (ref 1.8–7.4)
NEUTROPHILS NFR BLD AUTO: 71.5 % — SIGNIFICANT CHANGE UP (ref 43–77)
NRBC BLD AUTO-RTO: 0 /100 WBCS — SIGNIFICANT CHANGE UP (ref 0–0)
NT-PROBNP SERPL-SCNC: 785 PG/ML — HIGH (ref 0–300)
PLATELET # BLD AUTO: 232 K/UL — SIGNIFICANT CHANGE UP (ref 150–400)
POTASSIUM SERPL-MCNC: 4.9 MMOL/L — SIGNIFICANT CHANGE UP (ref 3.5–5.3)
POTASSIUM SERPL-SCNC: 4.9 MMOL/L — SIGNIFICANT CHANGE UP (ref 3.5–5.3)
PROT SERPL-MCNC: 6.7 G/DL — SIGNIFICANT CHANGE UP (ref 6–8.3)
RBC # BLD: 4.22 M/UL — SIGNIFICANT CHANGE UP (ref 3.8–5.2)
RBC # FLD: 14.6 % — HIGH (ref 10.3–14.5)
SODIUM SERPL-SCNC: 139 MMOL/L — SIGNIFICANT CHANGE UP (ref 135–145)
TROPONIN T, HIGH SENSITIVITY RESULT: 40 NG/L — SIGNIFICANT CHANGE UP (ref 0–51)
TROPONIN T, HIGH SENSITIVITY RESULT: 41 NG/L — SIGNIFICANT CHANGE UP (ref 0–51)
WBC # BLD: 9.51 K/UL — SIGNIFICANT CHANGE UP (ref 3.8–10.5)
WBC # FLD AUTO: 9.51 K/UL — SIGNIFICANT CHANGE UP (ref 3.8–10.5)

## 2025-05-29 PROCEDURE — 99285 EMERGENCY DEPT VISIT HI MDM: CPT | Mod: 25

## 2025-05-29 PROCEDURE — 85025 COMPLETE CBC W/AUTO DIFF WBC: CPT

## 2025-05-29 PROCEDURE — 76705 ECHO EXAM OF ABDOMEN: CPT

## 2025-05-29 PROCEDURE — 82435 ASSAY OF BLOOD CHLORIDE: CPT

## 2025-05-29 PROCEDURE — 76705 ECHO EXAM OF ABDOMEN: CPT | Mod: 26

## 2025-05-29 PROCEDURE — 93971 EXTREMITY STUDY: CPT | Mod: 26,RT

## 2025-05-29 PROCEDURE — 83735 ASSAY OF MAGNESIUM: CPT

## 2025-05-29 PROCEDURE — 82330 ASSAY OF CALCIUM: CPT

## 2025-05-29 PROCEDURE — 99285 EMERGENCY DEPT VISIT HI MDM: CPT

## 2025-05-29 PROCEDURE — 93010 ELECTROCARDIOGRAM REPORT: CPT

## 2025-05-29 PROCEDURE — 96374 THER/PROPH/DIAG INJ IV PUSH: CPT

## 2025-05-29 PROCEDURE — 83605 ASSAY OF LACTIC ACID: CPT

## 2025-05-29 PROCEDURE — 93005 ELECTROCARDIOGRAM TRACING: CPT | Mod: 76

## 2025-05-29 PROCEDURE — 84484 ASSAY OF TROPONIN QUANT: CPT

## 2025-05-29 PROCEDURE — 85014 HEMATOCRIT: CPT

## 2025-05-29 PROCEDURE — 83880 ASSAY OF NATRIURETIC PEPTIDE: CPT

## 2025-05-29 PROCEDURE — 84132 ASSAY OF SERUM POTASSIUM: CPT

## 2025-05-29 PROCEDURE — 84295 ASSAY OF SERUM SODIUM: CPT

## 2025-05-29 PROCEDURE — 93971 EXTREMITY STUDY: CPT

## 2025-05-29 PROCEDURE — 71045 X-RAY EXAM CHEST 1 VIEW: CPT | Mod: 26

## 2025-05-29 PROCEDURE — 80053 COMPREHEN METABOLIC PANEL: CPT

## 2025-05-29 PROCEDURE — 82947 ASSAY GLUCOSE BLOOD QUANT: CPT

## 2025-05-29 PROCEDURE — 85018 HEMOGLOBIN: CPT

## 2025-05-29 PROCEDURE — 36415 COLL VENOUS BLD VENIPUNCTURE: CPT

## 2025-05-29 PROCEDURE — 71045 X-RAY EXAM CHEST 1 VIEW: CPT

## 2025-05-29 PROCEDURE — 82803 BLOOD GASES ANY COMBINATION: CPT

## 2025-05-29 RX ORDER — ASPIRIN 325 MG
324 TABLET ORAL ONCE
Refills: 0 | Status: COMPLETED | OUTPATIENT
Start: 2025-05-29 | End: 2025-05-29

## 2025-05-29 RX ORDER — ACETAMINOPHEN 500 MG/5ML
1000 LIQUID (ML) ORAL ONCE
Refills: 0 | Status: COMPLETED | OUTPATIENT
Start: 2025-05-29 | End: 2025-05-29

## 2025-05-29 RX ADMIN — Medication 324 MILLIGRAM(S): at 06:49

## 2025-05-29 RX ADMIN — Medication 400 MILLIGRAM(S): at 02:49
